# Patient Record
Sex: FEMALE | Race: WHITE | NOT HISPANIC OR LATINO | Employment: FULL TIME | URBAN - METROPOLITAN AREA
[De-identification: names, ages, dates, MRNs, and addresses within clinical notes are randomized per-mention and may not be internally consistent; named-entity substitution may affect disease eponyms.]

---

## 2017-01-03 ENCOUNTER — COMMUNICATION - HEALTHEAST (OUTPATIENT)
Dept: FAMILY MEDICINE | Facility: CLINIC | Age: 42
End: 2017-01-03

## 2017-01-12 ENCOUNTER — OFFICE VISIT - HEALTHEAST (OUTPATIENT)
Dept: FAMILY MEDICINE | Facility: CLINIC | Age: 42
End: 2017-01-12

## 2017-01-12 DIAGNOSIS — Z00.00 ROUTINE GENERAL MEDICAL EXAMINATION AT A HEALTH CARE FACILITY: ICD-10-CM

## 2017-01-12 DIAGNOSIS — E03.8 OTHER SPECIFIED HYPOTHYROIDISM: ICD-10-CM

## 2017-01-12 LAB
CHOLEST SERPL-MCNC: 140 MG/DL
FASTING STATUS PATIENT QL REPORTED: YES
HDLC SERPL-MCNC: 37 MG/DL
LDLC SERPL CALC-MCNC: 88 MG/DL
TRIGL SERPL-MCNC: 73 MG/DL

## 2017-01-12 ASSESSMENT — MIFFLIN-ST. JEOR: SCORE: 1493.18

## 2017-01-17 ENCOUNTER — COMMUNICATION - HEALTHEAST (OUTPATIENT)
Dept: FAMILY MEDICINE | Facility: CLINIC | Age: 42
End: 2017-01-17

## 2017-01-17 LAB
HPV INTERPRETATION - HISTORICAL: NORMAL
HPV INTERPRETER - HISTORICAL: NORMAL

## 2017-01-19 LAB
BKR LAB AP ABNORMAL BLEEDING: NO
BKR LAB AP BIRTH CONTROL/HORMONES: NORMAL
BKR LAB AP CERVICAL APPEARANCE: NORMAL
BKR LAB AP GYN ADEQUACY: NORMAL
BKR LAB AP GYN INTERPRETATION: NORMAL
BKR LAB AP HPV REFLEX: NORMAL
BKR LAB AP LMP: NORMAL
BKR LAB AP PATIENT STATUS: NO
BKR LAB AP PREVIOUS ABNORMAL: NO
BKR LAB AP PREVIOUS NORMAL: 2013
HIGH RISK?: NO
PATH REPORT.COMMENTS IMP SPEC: NORMAL
RESULT FLAG (HE HISTORICAL CONVERSION): NORMAL

## 2017-05-19 ENCOUNTER — OFFICE VISIT - HEALTHEAST (OUTPATIENT)
Dept: FAMILY MEDICINE | Facility: CLINIC | Age: 42
End: 2017-05-19

## 2017-05-19 DIAGNOSIS — J02.9 SORE THROAT: ICD-10-CM

## 2017-05-19 DIAGNOSIS — E55.9 VITAMIN D DEFICIENCY: ICD-10-CM

## 2017-10-27 ENCOUNTER — COMMUNICATION - HEALTHEAST (OUTPATIENT)
Dept: FAMILY MEDICINE | Facility: CLINIC | Age: 42
End: 2017-10-27

## 2017-10-31 ENCOUNTER — OFFICE VISIT - HEALTHEAST (OUTPATIENT)
Dept: FAMILY MEDICINE | Facility: CLINIC | Age: 42
End: 2017-10-31

## 2017-10-31 DIAGNOSIS — L71.0 PERIORAL DERMATITIS: ICD-10-CM

## 2017-10-31 DIAGNOSIS — E03.8 OTHER SPECIFIED HYPOTHYROIDISM: ICD-10-CM

## 2017-10-31 ASSESSMENT — MIFFLIN-ST. JEOR: SCORE: 1502.25

## 2017-11-01 ENCOUNTER — COMMUNICATION - HEALTHEAST (OUTPATIENT)
Dept: FAMILY MEDICINE | Facility: CLINIC | Age: 42
End: 2017-11-01

## 2017-11-20 ENCOUNTER — OFFICE VISIT (OUTPATIENT)
Dept: ORTHOPEDICS | Facility: CLINIC | Age: 42
End: 2017-11-20
Payer: COMMERCIAL

## 2017-11-20 VITALS
SYSTOLIC BLOOD PRESSURE: 112 MMHG | BODY MASS INDEX: 30.22 KG/M2 | DIASTOLIC BLOOD PRESSURE: 72 MMHG | WEIGHT: 188 LBS | HEIGHT: 66 IN

## 2017-11-20 DIAGNOSIS — M76.61 RIGHT ACHILLES TENDINITIS: Primary | ICD-10-CM

## 2017-11-20 PROCEDURE — 99203 OFFICE O/P NEW LOW 30 MIN: CPT | Performed by: PEDIATRICS

## 2017-11-20 NOTE — MR AVS SNAPSHOT
After Visit Summary   11/20/2017    Katie Santoro    MRN: [id number]           Patient Information     Date Of Birth          1975        Visit Information        Provider Department      11/20/2017 8:00 AM Lev France, DO [facility]        Today's Diagnoses     Right Achilles tendinitis    -  1      Care Instructions    Ibuprofen (Advil) maximum of 800mg three times a day with food (Maximum of 2400mg/day)             Follow-ups after your visit        Additional Services     [facility] PT, HAND, AND CHIROPRACTIC REFERRAL       **This order will print in the [facility] Scheduling Office**    Physical Therapy, Hand Therapy and Chiropractic Care are available through:    *[facility]  *[facility]  *[facility]    Call one number to schedule at any of the above locations: [phone].    Your provider has referred you to: Physical Therapy at [facility] or [facility]    Indication/Reason for Referral: Right Achilles tendinitis  (primary encounter diagnosis)    Onset of Illness: ~3 weeks  Therapy Orders: Evaluate and Treat  Special Programs: None  Special Request: None    Michelle Martinez      Additional Comments for the Therapist or Chiropractor: working out at [facility] wants to keep working out    Please be aware that coverage of these services is subject to the terms and limitations of your health insurance plan.  Call member services at your health plan with any benefit or coverage questions.      Please bring the following to your appointment:    *Your personal calendar for scheduling future appointments  *Comfortable clothing                  Who to contact     If you have questions or need follow up information about today's clinic visit or your schedule please contact [facility] directly at [phone].  Normal or non-critical lab and imaging results will be communicated to you by Mynor  "letter or phone within 4 business days after the clinic has received the results. If you do not hear from us within 7 days, please contact the clinic through Tapgage or phone. If you have a critical or abnormal lab result, we will notify you by phone as soon as possible.  Submit refill requests through Tapgage or call your pharmacy and they will forward the refill request to us. Please allow 3 business days for your refill to be completed.          Additional Information About Your Visit        Tapgage Information     Tapgage lets you send messages to your doctor, view your test results, renew your prescriptions, schedule appointments and more. To sign up, go to [email or web address] . Click on \"Log in\" on the left side of the screen, which will take you to the Welcome page. Then click on \"Sign up Now\" on the right side of the page.     You will be asked to enter the access code listed below, as well as some personal information. Please follow the directions to create your username and password.     Your access code is: [email or web address]  Expires: 2018  8:32 AM     Your access code will  in 90 days. If you need help or a new code, please call your [facility] clinic or [phone].        Care EveryWhere ID     This is your Care EveryWhere ID. This could be used by other organizations to access your [facility] medical records  [id number]        Your Vitals Were     Height BMI (Body Mass Index)                5' 6\" (1.676 m) 30.34 kg/m2           Blood Pressure from Last 3 Encounters:   17 112/72   10/12/15 125/81   04/30/15 114/66    Weight from Last 3 Encounters:   17 188 lb (85.3 kg)   10/12/15 175 lb 4.8 oz (79.5 kg)   04/30/15 193 lb 3.2 oz (87.6 kg)              We Performed the Following     [facility] PT, HAND, AND CHIROPRACTIC REFERRAL        Primary Care Provider Office Phone # Fax #    Dee Morgan PA-C [phone] [address] [place] [address]        Equal " Access to Services     [facility]: Hadii aad ku hadmansoorvictorina Darianaali, wakamida luqdemetrioha, qabernardino mounakevinryan bell. So [facility] [phone].    ATENCIÓN: Si habla español, tiene a mccarty disposición servicios gratuitos de asistencia lingüística. Llame al [phone].    We comply with applicable federal civil rights laws and [place] laws. We do not discriminate on the basis of race, color, national origin, age, disability, sex, sexual orientation, or gender identity.            Thank you!     Thank you for choosing [facility]  for your care. Our goal is always to provide you with excellent care. Hearing back from our patients is one way we can continue to improve our services. Please take a few minutes to complete the written survey that you may receive in the mail after your visit with us. Thank you!             Your Updated Medication List - Protect others around you: Learn how to safely use, store and throw away your medicines at www.disposemymeds.org.          This list is accurate as of: 11/20/17  8:32 AM.  Always use your most recent med list.                   Brand Name Dispense Instructions for use Diagnosis    SYNTHROID 125 MCG tablet   Generic drug:  levothyroxine      Take 125 mcg by mouth daily

## 2017-11-20 NOTE — PROGRESS NOTES
"Sports Medicine Clinic Visit    PCP: Dee Morgan    Katie Yvan is a 41 year old female who is seen  as a self referral presenting with right Achilles tendon pain.  Pain has been present for about 2-3 weeks.  Did just begin Villagomez's, and began training without shoes.  Has tried KT tape, ice, stretching.      **  Last few weeks has noted pain in the middle of the posterior ankle, near the achilles. Pain increases with activity.  Doesn't have pain at rest. Has not noted, swelling, clicking, popping or snapping.  Does have burning in the heel, posterior aspect.  Pain increases with \"mountain climbers\".   No numbness and tingling.       Injury: overuse     Location of Pain: right Achilles tendon   Duration of Pain: 2-3 week(s)  Rating of Pain at worst: 6/10  Rating of Pain Currently: 1/10  Symptoms are better with: Rest  Symptoms are worse with: first few steps, jumping, being on toes in workouts   Additional Features:   Positive:    Negative: swelling, bruising, popping, grinding, catching, locking, instability, paresthesias, numbness, weakness, pain in other joints and systemic symptoms  Other evaluation and/or treatments so far consists of: Nothing  Prior History of related problems: HX of ankle sprains    Social History: IT     Review of Systems  Musculoskeletal: as above  Remainder of review of systems is negative including constitutional, CV, pulmonary, GI, Skin and Neurologic except as noted in HPI or medical history.    Patient Active Problem List   Diagnosis     CARDIOVASCULAR SCREENING; LDL GOAL LESS THAN 160     PMHx: as above    Past Surgical History:   Procedure Laterality Date     GYN SURGERY  2010         ORTHOPEDIC SURGERY Right 2006    shoulder arthroscopy     Family History   Problem Relation Age of Onset     Hypertension Mother      Allergies Mother      Thyroid Disease Mother      Coronary Artery Disease Father      Allergies Father      Cardiovascular Father      HEART DISEASE " "Father      Obesity Father      Coronary Artery Disease Maternal Grandmother      Hypertension Maternal Grandmother      Allergies Maternal Grandmother      Alcohol/Drug Maternal Grandmother      Arthritis Maternal Grandmother      Cardiovascular Maternal Grandmother      HEART DISEASE Maternal Grandmother      Thyroid Disease Maternal Grandmother      Coronary Artery Disease Maternal Grandfather      Cardiovascular Maternal Grandfather      Allergies Brother      Social History     Social History     Marital status:      Spouse name: N/A     Number of children: N/A     Years of education: N/A     Occupational History     Not on file.     Social History Main Topics     Smoking status: Never Smoker     Smokeless tobacco: Never Used     Alcohol use No     Drug use: No     Sexual activity: Not on file     Other Topics Concern     Not on file     Social History Narrative       Objective  /72  Ht 5' 6\" (1.676 m)  Wt 188 lb (85.3 kg)  BMI 30.34 kg/m2    GENERAL APPEARANCE: healthy, alert and no distress   GAIT: NORMAL  SKIN: no suspicious lesions or rashes  NEURO: Normal strength and tone, mentation intact and speech normal  PSYCH:  mentation appears normal and affect normal/bright  HEENT: no scleral icterus  CV: no extremity edema   RESP: nonlabored breathing    Right Ankle Exam:    Inspection:       no visible ecchymosis       no visible edema or effusion  KT tape over the posterior ankle    Foot inspection:       no deformity noted    ROM:        full ROM with dorsiflexion, plantarflexion, inversion and eversion       No change in pain with above     Strength:        Pain with DF, full strength        No change in pain with PF, eversion and inversion, full strength     Tender:       over achilles      Non-Tender:       remainder of foot and ankle    Skin:       well perfused       capillary refill less than 2 seconds    Special Tests:       Galloway Test - normal        Able to go up onto toes, " increased pain        Able to single leg stance, increased pain     Sensation:  Grossly intact to light touch      Radiology:  None today     Assessment:  1. Right Achilles tendinitis        Plan:  Discussed the assessment with the patient and her daugther.    Discussed:  *Symptom Treatment   *Activity Modification   *Support - Heel lift   *Rehab - Home exercises vs Formal PT    *Medication - OTC     Topical Treatments: Ice prn   Over the counter medication: Ibuprofen (Advil) maximum of 800mg three times a day with food  Activity Modification: as discussed . Advised that continued impact activities likely will limit progress she makes with therapy. She understands, and wants to continue with workouts for now.  Rehab: Physical Therapy: [facility] - [phone]  Medical Equipment: Heel lift shown to the patient; declined gel cups   Follow up: in 3-4 weeks if not improving; otherwise, as needed  . If not improving, likely rest from kickboxing as the next step.  Questions answered. The patient indicates understanding of these issues and agrees with the plan.     Lev France, DO, CAQ         Disclaimer: This note consists of symbols derived from keyboarding, dictation and/or voice recognition software. As a result, there may be errors in the script that have gone undetected. Please consider this when interpreting information found in this chart.

## 2017-11-20 NOTE — PATIENT INSTRUCTIONS
Ibuprofen (Advil) maximum of 800mg three times a day with food (Maximum of 2400mg/day)     Start physical therapy.

## 2017-11-20 NOTE — LETTER
"    2017         RE: Katie Santoro  [address] [place] [address]        Dear Colleague,    Thank you for referring your patient, Katie Santoro, to the [facility]. Please see a copy of my visit note below.    Sports Medicine Clinic Visit    PCP: Dee Morgan    Katie Santoro is a 41 year old female who is seen  as a self referral presenting with right Achilles tendon pain.  Pain has been present for about 2-3 weeks.  Did just begin Villagomez's, and began training without shoes.  Has tried KT tape, ice, stretching.      **  Last few weeks has noted pain in the middle of the posterior ankle, near the achilles. Pain increases with activity.  Doesn't have pain at rest. Has not noted, swelling, clicking, popping or snapping.  Does have burning in the heel, posterior aspect.  Pain increases with \"mountain climbers\".   No numbness and tingling.       Injury: overuse     Location of Pain: right Achilles tendon   Duration of Pain: 2-3 week(s)  Rating of Pain at worst: 6/10  Rating of Pain Currently: 1/10  Symptoms are better with: Rest  Symptoms are worse with: first few steps, jumping, being on toes in workouts   Additional Features:   Positive:    Negative: swelling, bruising, popping, grinding, catching, locking, instability, paresthesias, numbness, weakness, pain in other joints and systemic symptoms  Other evaluation and/or treatments so far consists of: Nothing  Prior History of related problems: HX of ankle sprains    Social History: IT     Review of Systems  Musculoskeletal: as above  Remainder of review of systems is negative including constitutional, CV, pulmonary, GI, Skin and Neurologic except as noted in HPI or medical history.    Patient Active Problem List   Diagnosis     CARDIOVASCULAR SCREENING; LDL GOAL LESS THAN 160     PMHx: as above    Past Surgical History:   Procedure Laterality Date     GYN SURGERY  2010         ORTHOPEDIC SURGERY Right  " "   shoulder arthroscopy     Family History   Problem Relation Age of Onset     Hypertension Mother      Allergies Mother      Thyroid Disease Mother      Coronary Artery Disease Father      Allergies Father      Cardiovascular Father      HEART DISEASE Father      Obesity Father      Coronary Artery Disease Maternal Grandmother      Hypertension Maternal Grandmother      Allergies Maternal Grandmother      Alcohol/Drug Maternal Grandmother      Arthritis Maternal Grandmother      Cardiovascular Maternal Grandmother      HEART DISEASE Maternal Grandmother      Thyroid Disease Maternal Grandmother      Coronary Artery Disease Maternal Grandfather      Cardiovascular Maternal Grandfather      Allergies Brother      Social History     Social History     Marital status:      Spouse name: N/A     Number of children: N/A     Years of education: N/A     Occupational History     Not on file.     Social History Main Topics     Smoking status: Never Smoker     Smokeless tobacco: Never Used     Alcohol use No     Drug use: No     Sexual activity: Not on file     Other Topics Concern     Not on file     Social History Narrative       Objective  /72  Ht 5' 6\" (1.676 m)  Wt 188 lb (85.3 kg)  BMI 30.34 kg/m2    GENERAL APPEARANCE: healthy, alert and no distress   GAIT: NORMAL  SKIN: no suspicious lesions or rashes  NEURO: Normal strength and tone, mentation intact and speech normal  PSYCH:  mentation appears normal and affect normal/bright  HEENT: no scleral icterus  CV: no extremity edema   RESP: nonlabored breathing    Right Ankle Exam:    Inspection:       no visible ecchymosis       no visible edema or effusion  KT tape over the posterior ankle    Foot inspection:       no deformity noted    ROM:        full ROM with dorsiflexion, plantarflexion, inversion and eversion       No change in pain with above     Strength:        Pain with DF, full strength        No change in pain with PF, eversion and inversion, " full strength     Tender:       over achilles      Non-Tender:       remainder of foot and ankle    Skin:       well perfused       capillary refill less than 2 seconds    Special Tests:       Galloway Test - normal        Able to go up onto toes, increased pain        Able to single leg stance, increased pain     Sensation:  Grossly intact to light touch      Radiology:  None today     Assessment:  1. Right Achilles tendinitis        Plan:  Discussed the assessment with the patient and her daugther.    Discussed:  *Symptom Treatment   *Activity Modification   *Support - Heel lift   *Rehab - Home exercises vs Formal PT    *Medication - OTC     Topical Treatments: Ice prn   Over the counter medication: Ibuprofen (Advil) maximum of 800mg three times a day with food  Activity Modification: as discussed . Advised that continued impact activities likely will limit progress she makes with therapy. She understands, and wants to continue with workouts for now.  Rehab: Physical Therapy: [facility] - [phone]  Medical Equipment: Heel lift shown to the patient; declined gel cups   Follow up: in 3-4 weeks if not improving; otherwise, as needed  . If not improving, likely rest from kickboxing as the next step.  Questions answered. The patient indicates understanding of these issues and agrees with the plan.     Lev France DO, CAQ         Disclaimer: This note consists of symbols derived from keyboarding, dictation and/or voice recognition software. As a result, there may be errors in the script that have gone undetected. Please consider this when interpreting information found in this chart.        Again, thank you for allowing me to participate in the care of your patient.        Sincerely,        Lev France DO

## 2017-12-01 ENCOUNTER — THERAPY VISIT (OUTPATIENT)
Dept: PHYSICAL THERAPY | Facility: CLINIC | Age: 42
End: 2017-12-01
Payer: COMMERCIAL

## 2017-12-01 DIAGNOSIS — M76.60 ACHILLES TENDINITIS: Primary | ICD-10-CM

## 2017-12-01 PROCEDURE — 97161 PT EVAL LOW COMPLEX 20 MIN: CPT | Mod: GP | Performed by: PHYSICAL THERAPIST

## 2017-12-01 PROCEDURE — 97140 MANUAL THERAPY 1/> REGIONS: CPT | Mod: GP | Performed by: PHYSICAL THERAPIST

## 2017-12-01 PROCEDURE — 97110 THERAPEUTIC EXERCISES: CPT | Mod: GP | Performed by: PHYSICAL THERAPIST

## 2017-12-01 PROCEDURE — 97112 NEUROMUSCULAR REEDUCATION: CPT | Mod: GP | Performed by: PHYSICAL THERAPIST

## 2017-12-01 NOTE — MR AVS SNAPSHOT
"              After Visit Summary   12/1/2017    Katie Santoro    MRN: [id number]           Patient Information     Date Of Birth          1975        Visit Information        Provider Department      12/1/2017 8:10 AM Stefanie Samson, PT [facility]        Today's Diagnoses     Achilles tendinitis    -  1       Follow-ups after your visit        Your next 10 appointments already scheduled     Dec 08, 2017  8:50 AM CST   [facility] Extremity with Darlene Obrien [facility]    [address] [address] [phone]            Dec 15, 2017  7:30 AM CST   [facility] Extremity with Darlene Obrien [facility]    [address]  [facility] [address] [phone]              Who to contact     If you have questions or need follow up information about today's clinic visit or your schedule please contact [facility] directly at [phone].  Normal or non-critical lab and imaging results will be communicated to you by iDoc24hart, letter or phone within 4 business days after the clinic has received the results. If you do not hear from us within 7 days, please contact the clinic through Blipifyt or phone. If you have a critical or abnormal lab result, we will notify you by phone as soon as possible.  Submit refill requests through Viss or call your pharmacy and they will forward the refill request to us. Please allow 3 business days for your refill to be completed.          Additional Information About Your Visit        [email or web address] [employer] Information     Viss lets you send messages to your doctor, view your test results, renew your prescriptions, schedule appointments and more. To sign up, go to [email or web address] . Click on \"Log in\" on the left side of the screen, which will take you to the Welcome page. Then click on \"Sign up Now\" on the right side of the page.     You will be asked to enter the " access code listed below, as well as some personal information. Please follow the directions to create your username and password.     Your access code is: [email or web address]  Expires: 2018  8:32 AM     Your access code will  in 90 days. If you need help or a new code, please call your [facility] clinic or [phone].        Care EveryWhere ID     This is your Care EveryWhere ID. This could be used by other organizations to access your [facility] medical records  [id number]         Blood Pressure from Last 3 Encounters:   17 112/72   10/12/15 125/81   04/30/15 114/66    Weight from Last 3 Encounters:   17 85.3 kg (188 lb)   10/12/15 79.5 kg (175 lb 4.8 oz)   04/30/15 87.6 kg (193 lb 3.2 oz)              We Performed the Following     HC PT EVAL, LOW COMPLEXITY     [facility] INITIAL EVAL REPORT     MANUAL THER TECH,1+REGIONS,EA 15 MIN     NEUROMUSCULAR RE-EDUCATION     THERAPEUTIC EXERCISES        Primary Care Provider Office Phone # Fax #    Dee Morgan PA-C [phone] [address] [facility] [address]        Equal Access to Services     [facility]: Hadii aad ku hadasho Soomaali, waaxda luqadaha, qaybta kaalmada adeegyada, waxay ericain haylizn antonette bowles. So [facility] [phone].    ATENCIÓN: Si habla español, tiene a mccarty disposición servicios gratuitos de asistencia lingüística. Llame al [phone].    We comply with applicable federal civil rights laws and [place] laws. We do not discriminate on the basis of race, color, national origin, age, disability, sex, sexual orientation, or gender identity.            Thank you!     Thank you for choosing [facility]  for your care. Our goal is always to provide you with excellent care. Hearing back from our patients is one way we can continue to improve our services. Please take a few minutes to complete the written survey that you may receive in the mail after your visit with us. Thank you!              Your Updated Medication List - Protect others around you: Learn how to safely use, store and throw away your medicines at www.disposemymeds.org.          This list is accurate as of: 12/1/17 12:43 PM.  Always use your most recent med list.                   Brand Name Dispense Instructions for use Diagnosis    SYNTHROID 125 MCG tablet   Generic drug:  levothyroxine      Take 125 mcg by mouth daily

## 2017-12-01 NOTE — PROGRESS NOTES
Subjective:    Patient is a 41 year old female presenting with rehab right ankle/foot hpi. History provided by: Pt has had stress fractures in right foot in past and ankle sprains.   Katie Santoro is a 41 year old female with a right ankle condition.  Condition occurred with:  Repetition/overuse and insidious onset.  Condition occurred: during recreation/sport.  This is a new condition  One month ago.    Patient reports pain:  Posterior.    Pain is described as aching (burning into heel with higher level activity) and is constant and reported as 4/10.  Associated symptoms:  Loss of motion/stiffness. Worse during: activity decpendent  stiff in am gets better duering day unless heacy acitivity.  Symptoms are exacerbated by activity, descending stairs and ascending stairs (jumping, mountain climer, lunges) and relieved by ice.  Since onset symptoms are unchanged.        General health as reported by patient is excellent.  Pertinent medical history includes:  Overweight.  Medical allergies: no.  Other surgeries include:  Orthopedic surgery (shoulder srthroscopy).            Barriers include:  None as reported by patient.    Red flags:  None as reported by patient.                        Objective:    Standing Alignment:                Ankle/Foot:  Normal (excessive supination in shoe)              Ankle/Foot Evaluation  ROM:    AROM:    Dorsiflexion:  Left:   15  Right:   15  Plantarflexion:  Left:  Wnl    Right:  Wnl  Inversion:  Left:  15     Right:  15  Eversion:  30     Right:  30      PROM:                Pain: pain with end dorsi flexion    Strength:    Dorsiflexion:  Right:  5/5   Strong/pain free  Pain:-  Plantarflexion: Left:  5/5   Strong/pain free  Pain:-   Right: /5 strong/painful  Pain:++  Inversion:Left:  5/5  Strong/pain free  Pain:-     Right:  5/5  Strong/pain free  Pain:-  Eversion:Left: 5/5   Pain:-  Right:  5/5  Strong/pain free  Pain:-      Anterior Tibialis:Right: /5 Pain:-  Posterior Tibialis:  Right: 5/5   Pain:-  Peroneals: Right: 5/5   Pain:-    Gastroc/Soleus:Right: /5 strong/painful  Pain:++    SPECIAL TESTS:       Right ankle negative for the following special tests:  Galloway sign  PALPATION:     Right ankle tenderness present at:   gastroc/soleus and achilles tendon  EDEMA: normal            FUNCTIONAL TESTS:         Quad:      Bilateral Leg Squat:  Control is mild loss of control and excessive anterior knee excursion    Proprioception:  Stork Balance Test: % of Uninvolved: balance on right significantly reduced from left wheneyes are closed                                                    General     ROS    Assessment/Plan:      Patient is a 41 year old female with right side ankle complaints.    Patient has the following significant findings with corresponding treatment plan.                Diagnosis 1:  r achilles tendinitis  Pain -  hot/cold therapy, manual therapy and self management  Decreased strength - therapeutic exercise and therapeutic activities  Impaired balance - neuro re-education and therapeutic activities  Decreased proprioception - neuro re-education and therapeutic activities    Therapy Evaluation Codes:   1) History comprised of:   Personal factors that impact the plan of care:      None.    Comorbidity factors that impact the plan of care are:      Overweight and thyroid problems.     Medications impacting care: None.  2) Examination of Body Systems comprised of:   Body structures and functions that impact the plan of care:      Ankle.   Activity limitations that impact the plan of care are:      Jumping, Sports and Stairs.  3) Clinical presentation characteristics are:   Stable/Uncomplicated.  4) Decision-Making    Low complexity using standardized patient assessment instrument and/or measureable assessment of functional outcome.  Cumulative Therapy Evaluation is: Low complexity.    Previous and current functional limitations:  (See Goal Flow Sheet for this information)     Short term and Long term goals: (See Goal Flow Sheet for this information)     Communication ability:  Patient appears to be able to clearly communicate and understand verbal and written communication and follow directions correctly.  Treatment Explanation - The following has been discussed with the patient:   RX ordered/plan of care  Anticipated outcomes  Possible risks and side effects  This patient would benefit from PT intervention to resume normal activities.   Rehab potential is excellent.    Frequency:  1 X week, once daily  Duration:  for 6 weeks  Discharge Plan:  Achieve all LTG.  Independent in home treatment program.  Reach maximal therapeutic benefit.    Please refer to the daily flowsheet for treatment today, total treatment time and time spent performing 1:1 timed codes.

## 2017-12-08 ENCOUNTER — THERAPY VISIT (OUTPATIENT)
Dept: PHYSICAL THERAPY | Facility: CLINIC | Age: 42
End: 2017-12-08
Payer: COMMERCIAL

## 2017-12-08 DIAGNOSIS — M76.61 ACHILLES TENDINITIS OF RIGHT LOWER EXTREMITY: Primary | ICD-10-CM

## 2017-12-08 PROCEDURE — 97110 THERAPEUTIC EXERCISES: CPT | Mod: GP | Performed by: PHYSICAL THERAPIST

## 2017-12-08 PROCEDURE — 97140 MANUAL THERAPY 1/> REGIONS: CPT | Mod: GP | Performed by: PHYSICAL THERAPIST

## 2017-12-15 ENCOUNTER — THERAPY VISIT (OUTPATIENT)
Dept: PHYSICAL THERAPY | Facility: CLINIC | Age: 42
End: 2017-12-15
Payer: COMMERCIAL

## 2017-12-15 DIAGNOSIS — M76.61 ACHILLES TENDINITIS OF RIGHT LOWER EXTREMITY: ICD-10-CM

## 2017-12-15 PROCEDURE — 97112 NEUROMUSCULAR REEDUCATION: CPT | Mod: GP | Performed by: PHYSICAL THERAPIST

## 2017-12-15 PROCEDURE — 97140 MANUAL THERAPY 1/> REGIONS: CPT | Mod: GP | Performed by: PHYSICAL THERAPIST

## 2017-12-15 PROCEDURE — 97035 APP MDLTY 1+ULTRASOUND EA 15: CPT | Mod: GP | Performed by: PHYSICAL THERAPIST

## 2017-12-22 ENCOUNTER — THERAPY VISIT (OUTPATIENT)
Dept: PHYSICAL THERAPY | Facility: CLINIC | Age: 42
End: 2017-12-22
Payer: COMMERCIAL

## 2017-12-22 DIAGNOSIS — M76.61 ACHILLES TENDINITIS OF RIGHT LOWER EXTREMITY: ICD-10-CM

## 2017-12-22 PROCEDURE — 97140 MANUAL THERAPY 1/> REGIONS: CPT | Mod: GP | Performed by: PHYSICAL THERAPIST

## 2017-12-22 PROCEDURE — 97112 NEUROMUSCULAR REEDUCATION: CPT | Mod: GP | Performed by: PHYSICAL THERAPIST

## 2017-12-22 PROCEDURE — 97035 APP MDLTY 1+ULTRASOUND EA 15: CPT | Mod: GP | Performed by: PHYSICAL THERAPIST

## 2017-12-24 ENCOUNTER — HEALTH MAINTENANCE LETTER (OUTPATIENT)
Age: 42
End: 2017-12-24

## 2017-12-29 ENCOUNTER — APPOINTMENT (OUTPATIENT)
Dept: PHYSICAL THERAPY | Facility: CLINIC | Age: 42
End: 2017-12-29
Payer: COMMERCIAL

## 2018-05-24 ENCOUNTER — OFFICE VISIT - HEALTHEAST (OUTPATIENT)
Dept: FAMILY MEDICINE | Facility: CLINIC | Age: 43
End: 2018-05-24

## 2018-05-24 DIAGNOSIS — Z00.00 ROUTINE GENERAL MEDICAL EXAMINATION AT A HEALTH CARE FACILITY: ICD-10-CM

## 2018-05-24 DIAGNOSIS — E03.8 OTHER SPECIFIED HYPOTHYROIDISM: ICD-10-CM

## 2018-05-24 DIAGNOSIS — M25.50 ARTHRALGIA: ICD-10-CM

## 2018-05-24 LAB
ALBUMIN SERPL-MCNC: 4 G/DL (ref 3.5–5)
ALP SERPL-CCNC: 87 U/L (ref 45–120)
ALT SERPL W P-5'-P-CCNC: 11 U/L (ref 0–45)
ANION GAP SERPL CALCULATED.3IONS-SCNC: 11 MMOL/L (ref 5–18)
AST SERPL W P-5'-P-CCNC: 15 U/L (ref 0–40)
BILIRUB SERPL-MCNC: 0.5 MG/DL (ref 0–1)
BUN SERPL-MCNC: 19 MG/DL (ref 8–22)
C REACTIVE PROTEIN LHE: 0.5 MG/DL (ref 0–0.8)
CALCIUM SERPL-MCNC: 9.2 MG/DL (ref 8.5–10.5)
CCP AB SER IA-ACNC: 1 U/ML
CHLORIDE BLD-SCNC: 106 MMOL/L (ref 98–107)
CHOLEST SERPL-MCNC: 139 MG/DL
CO2 SERPL-SCNC: 23 MMOL/L (ref 22–31)
CREAT SERPL-MCNC: 0.81 MG/DL (ref 0.6–1.1)
ERYTHROCYTE [DISTWIDTH] IN BLOOD BY AUTOMATED COUNT: 13.4 % (ref 11–14.5)
ERYTHROCYTE [SEDIMENTATION RATE] IN BLOOD BY WESTERGREN METHOD: 14 MM/HR (ref 0–20)
FASTING STATUS PATIENT QL REPORTED: YES
GFR SERPL CREATININE-BSD FRML MDRD: >60 ML/MIN/1.73M2
GLUCOSE BLD-MCNC: 86 MG/DL (ref 70–125)
HCT VFR BLD AUTO: 39.4 % (ref 35–47)
HDLC SERPL-MCNC: 38 MG/DL
HGB BLD-MCNC: 13.3 G/DL (ref 12–16)
LDLC SERPL CALC-MCNC: 84 MG/DL
MCH RBC QN AUTO: 30.7 PG (ref 27–34)
MCHC RBC AUTO-ENTMCNC: 33.8 G/DL (ref 32–36)
MCV RBC AUTO: 91 FL (ref 80–100)
PLATELET # BLD AUTO: 256 THOU/UL (ref 140–440)
PMV BLD AUTO: 6.7 FL (ref 7–10)
POTASSIUM BLD-SCNC: 4.2 MMOL/L (ref 3.5–5)
PROT SERPL-MCNC: 6.8 G/DL (ref 6–8)
RBC # BLD AUTO: 4.34 MILL/UL (ref 3.8–5.4)
RHEUMATOID FACT SERPL-ACNC: <15 IU/ML (ref 0–30)
SODIUM SERPL-SCNC: 140 MMOL/L (ref 136–145)
TRIGL SERPL-MCNC: 87 MG/DL
TSH SERPL DL<=0.005 MIU/L-ACNC: 0.53 UIU/ML (ref 0.3–5)
URATE SERPL-MCNC: 4.8 MG/DL (ref 2–7.5)
WBC: 3.5 THOU/UL (ref 4–11)

## 2018-05-24 ASSESSMENT — MIFFLIN-ST. JEOR: SCORE: 1461.43

## 2018-05-25 LAB — B BURGDOR IGG+IGM SER QL: 0.06 INDEX VALUE

## 2018-05-29 LAB — ANA SER QL: 1.5 U

## 2018-09-24 ENCOUNTER — THERAPY VISIT (OUTPATIENT)
Dept: PHYSICAL THERAPY | Facility: CLINIC | Age: 43
End: 2018-09-24
Payer: COMMERCIAL

## 2018-09-24 DIAGNOSIS — M75.42 IMPINGEMENT SYNDROME, SHOULDER, LEFT: ICD-10-CM

## 2018-09-24 PROBLEM — M76.60 ACHILLES TENDINITIS: Status: RESOLVED | Noted: 2017-12-01 | Resolved: 2018-09-24

## 2018-09-24 PROCEDURE — 97110 THERAPEUTIC EXERCISES: CPT | Mod: GP | Performed by: PHYSICAL THERAPIST

## 2018-09-24 PROCEDURE — 97161 PT EVAL LOW COMPLEX 20 MIN: CPT | Mod: GP | Performed by: PHYSICAL THERAPIST

## 2018-09-24 NOTE — PROGRESS NOTES
[facility] Initial Evaluation  Subjective:  Patient is a 42 year old female presenting with rehab left shoulder hpi. The history is provided by the patient.   Katie Santoro is a 42 year old female with a left shoulder condition.  Condition occurred with:  Other.  Condition occurred: other.  This is a new condition  Over the last 4 months has been hurting, not sure what started it, but now is hurting with certain movements and becoming more sharp.    Patient reports pain:  Lateral.  Radiates to:  Upper arm.  Pain is described as aching and sharp and is intermittent and reported as 8/10.  Associated symptoms:  Painful arc. Pain is worse during the day.  Symptoms are exacerbated by using arm at shoulder level, certain positions, using arm overhead, lying on extremity and other (pushing) and relieved by rest.  Since onset symptoms are gradually worsening.        General health as reported by patient is excellent.  Pertinent medical history includes:  Thyroid problems.  Medical allergies: adhesive.  Other surgeries include:  Orthopedic surgery (R shoulder decompression).  Current medications:  Thyroid medication and anti-inflammatory.  Current occupation .  Patient is working in normal job without restrictions.  Employment tasks: computer work.    Barriers include:  None as reported by the patient.    Red flags:  None as reported by the patient.                        Objective:  System                   Shoulder Evaluation:  ROM:  AROM:    Flexion:  Left:  Full        Abduction:  Left: 70 deg, pain       Internal Rotation:  Left:  To T8                      PROM:    Flexion:  Left:  168          Abduction:  Left:  170        Internal Rotation:  Left:  80      External Rotation:  Left:  65    Right:  90                    Strength:    Flexion: Left:5/5    Pain: -        Abduction:  Left: 4-/5   Pain:++        Internal Rotation:  Left:5/5      Pain:+      External Rotation:    Left:5/5      Pain:+               Special Tests:    Left shoulder positive for the following special tests:  Impingement  Left shoulder negative for the following special tests:  Labral and Rotator cuff tear    Palpation:    Left shoulder tenderness present at:  Supraspinatus and Infraspinatus  Left shoulder tenderness not present at: Biceps or Bicipital Groove    Mobility Tests:      Glenohumeral posterior left:  Hypomobile    Glenohumeral inferior left:  Hypomobile          Scapulohumeral rhythm right:  Hypomobile                                     General     ROS    Assessment/Plan:    Patient is a 42 year old female with left side shoulder complaints.    Patient has the following significant findings with corresponding treatment plan.                Diagnosis 1:  Left shoulder impingement  Pain -  hot/cold therapy, US and manual therapy  Decreased ROM/flexibility - manual therapy and therapeutic exercise  Decreased joint mobility - manual therapy and therapeutic exercise  Decreased strength - therapeutic exercise and therapeutic activities  Impaired muscle performance - neuro re-education    Therapy Evaluation Codes:   1) History comprised of:   Personal factors that impact the plan of care:      None.    Comorbidity factors that impact the plan of care are:      None.     Medications impacting care: Anti-inflammatory.  2) Examination of Body Systems comprised of:   Body structures and functions that impact the plan of care:      Shoulder.   Activity limitations that impact the plan of care are:      Bathing, Dressing, Lifting and Laying down.  3) Clinical presentation characteristics are:   Evolving/Changing.  4) Decision-Making    Moderate complexity using standardized patient assessment instrument and/or measureable assessment of functional outcome.  Cumulative Therapy Evaluation is: Low complexity.    Previous and current functional limitations:  (See Goal Flow Sheet for this information)    Short term and  Long term goals: (See Goal Flow Sheet for this information)     Communication ability:  Patient appears to be able to clearly communicate and understand verbal and written communication and follow directions correctly.  Treatment Explanation - The following has been discussed with the patient:   RX ordered/plan of care  Anticipated outcomes  Possible risks and side effects  This patient would benefit from PT intervention to resume normal activities.   Rehab potential is excellent.    Frequency:  1 X week, once daily  Duration:  for 8 weeks  Discharge Plan:  Achieve all LTG.  Independent in home treatment program.  Reach maximal therapeutic benefit.    Please refer to the daily flowsheet for treatment today, total treatment time and time spent performing 1:1 timed codes.

## 2018-09-24 NOTE — MR AVS SNAPSHOT
After Visit Summary   9/24/2018    Katie Santoro    MRN: [id number]           Patient Information     Date Of Birth          1975        Visit Information        Provider Department      9/24/2018 2:50 PM Darlene Obrien, PT [facility]        Today's Diagnoses     Impingement syndrome, shoulder, left           Follow-ups after your visit        Your next 10 appointments already scheduled     Sep 26, 2018  7:30 AM CDT   [place]ADE[place] Extremity with Amada Anguiano [facility]   [facility] ([facility])    [address]  [facility] [address]   [phone]            Oct 03, 2018  7:30 AM CDT   [facility]ADE[place] Extremity with Amada Anguiano [facility] ([facility]    [address] [facility] [address]   [phone]            Oct 05, 2018  7:30 AM CDT   [place] Extremity with Amada Anguiano [facility] ([facility])    [address]  [facility] [address]   [phone]            Oct 08, 2018  7:40 AM CDT   [place] Extremity with Amada Anguiano [facility] ([facility])    [address]  [facility] [address]   [phone]            Oct 10, 2018  8:00 AM CDT   [place]ADE[place] Extremity with Darlene Obrien PT   [facility] ([facility])    [address]  [facility] [address]   [phone]              Who to contact     If you have questions or need follow up information about today's clinic visit or your schedule please contact [facility] directly at [phone].  Normal or non-critical lab and imaging results will be communicated to you by MyChart, letter or phone within 4 business days after the clinic has received the results. If you do not hear from us within 7 days, please contact the clinic through MyChart or phone. If you have a critical or abnormal lab result, we will notify you by phone as soon as possible.  Submit refill requests through [employer]  "or call your pharmacy and they will forward the refill request to us. Please allow 3 business days for your refill to be completed.          Additional Information About Your Visit        MyChart Information     [employer] lets you send messages to your doctor, view your test results, renew your prescriptions, schedule appointments and more. To sign up, go to [email or web address] . Click on \"Log in\" on the left side of the screen, which will take you to the Welcome page. Then click on \"Sign up Now\" on the right side of the page.     You will be asked to enter the access code listed below, as well as some personal information. Please follow the directions to create your username and password.     Your access code is: [email or web address]  Expires: 2018  8:06 AM     Your access code will  in 90 days. If you need help or a new code, please call your [facility] clinic or [phone].        Care EveryWhere ID     This is your Care EveryWhere ID. This could be used by other organizations to access your [facility] medical records  [id number]         Blood Pressure from Last 3 Encounters:   17 112/72   10/12/15 125/81   04/30/15 114/66    Weight from Last 3 Encounters:   17 85.3 kg (188 lb)   10/12/15 79.5 kg (175 lb 4.8 oz)   04/30/15 87.6 kg (193 lb 3.2 oz)              We Performed the Following     PT Eval, Low Complexity (55006)     Therapeutic Exercises        Primary Care Provider Office Phone # Fax #    Dee Morgan PA-C [phone] [address] [address]        Equal Access to Services     STEPHENIE BRUNNER : Hadii rosalina diaz Soammon, waaxda luqadaha, qaybta kaalmada adedarriusyada, ryan bowles. So [facility] [phone].    ATENCIÓN: Si habla español, tiene a mccarty disposición servicios gratuitos de asistencia lingüística. Llame al [phone].    We comply with applicable federal civil rights laws and [place] laws. We do not discriminate " on the basis of race, color, national origin, age, disability, sex, sexual orientation, or gender identity.            Thank you!     Thank you for choosing [facility]  for your care. Our goal is always to provide you with excellent care. Hearing back from our patients is one way we can continue to improve our services. Please take a few minutes to complete the written survey that you may receive in the mail after your visit with us. Thank you!             Your Updated Medication List - Protect others around you: Learn how to safely use, store and throw away your medicines at www.disposemymeds.org.          This list is accurate as of 9/24/18 11:59 PM.  Always use your most recent med list.                   Brand Name Dispense Instructions for use Diagnosis    SYNTHROID 125 MCG tablet   Generic drug:  levothyroxine      Take 125 mcg by mouth daily

## 2018-09-25 PROBLEM — M75.42 IMPINGEMENT SYNDROME, SHOULDER, LEFT: Status: ACTIVE | Noted: 2018-09-25

## 2018-09-26 ENCOUNTER — THERAPY VISIT (OUTPATIENT)
Dept: PHYSICAL THERAPY | Facility: CLINIC | Age: 43
End: 2018-09-26
Payer: COMMERCIAL

## 2018-09-26 DIAGNOSIS — M75.42 IMPINGEMENT SYNDROME, SHOULDER, LEFT: ICD-10-CM

## 2018-09-26 PROCEDURE — 97110 THERAPEUTIC EXERCISES: CPT | Mod: GP

## 2018-09-26 PROCEDURE — 97140 MANUAL THERAPY 1/> REGIONS: CPT | Mod: GP

## 2018-10-03 ENCOUNTER — THERAPY VISIT (OUTPATIENT)
Dept: PHYSICAL THERAPY | Facility: CLINIC | Age: 43
End: 2018-10-03
Payer: COMMERCIAL

## 2018-10-03 DIAGNOSIS — M75.42 IMPINGEMENT SYNDROME, SHOULDER, LEFT: ICD-10-CM

## 2018-10-03 PROCEDURE — 97140 MANUAL THERAPY 1/> REGIONS: CPT | Mod: GP

## 2018-10-03 PROCEDURE — 97112 NEUROMUSCULAR REEDUCATION: CPT | Mod: GP

## 2018-10-03 PROCEDURE — 97530 THERAPEUTIC ACTIVITIES: CPT | Mod: GP

## 2018-10-05 ENCOUNTER — THERAPY VISIT (OUTPATIENT)
Dept: PHYSICAL THERAPY | Facility: CLINIC | Age: 43
End: 2018-10-05
Payer: COMMERCIAL

## 2018-10-05 DIAGNOSIS — M75.42 IMPINGEMENT SYNDROME, SHOULDER, LEFT: ICD-10-CM

## 2018-10-05 PROCEDURE — 97112 NEUROMUSCULAR REEDUCATION: CPT | Mod: GP

## 2018-10-05 PROCEDURE — 97110 THERAPEUTIC EXERCISES: CPT | Mod: GP

## 2018-10-05 PROCEDURE — 97140 MANUAL THERAPY 1/> REGIONS: CPT | Mod: GP

## 2018-10-08 ENCOUNTER — THERAPY VISIT (OUTPATIENT)
Dept: PHYSICAL THERAPY | Facility: CLINIC | Age: 43
End: 2018-10-08
Payer: COMMERCIAL

## 2018-10-08 DIAGNOSIS — M75.42 IMPINGEMENT SYNDROME, SHOULDER, LEFT: ICD-10-CM

## 2018-10-08 PROCEDURE — 97112 NEUROMUSCULAR REEDUCATION: CPT | Mod: GP

## 2018-10-08 PROCEDURE — 97110 THERAPEUTIC EXERCISES: CPT | Mod: GP

## 2018-10-08 PROCEDURE — 97140 MANUAL THERAPY 1/> REGIONS: CPT | Mod: GP

## 2018-10-10 ENCOUNTER — THERAPY VISIT (OUTPATIENT)
Dept: PHYSICAL THERAPY | Facility: CLINIC | Age: 43
End: 2018-10-10
Payer: COMMERCIAL

## 2018-10-10 DIAGNOSIS — M75.42 IMPINGEMENT SYNDROME, SHOULDER, LEFT: ICD-10-CM

## 2018-10-10 PROCEDURE — 97110 THERAPEUTIC EXERCISES: CPT | Mod: GP | Performed by: PHYSICAL THERAPIST

## 2018-10-10 PROCEDURE — 97112 NEUROMUSCULAR REEDUCATION: CPT | Mod: GP | Performed by: PHYSICAL THERAPIST

## 2018-10-10 PROCEDURE — 97140 MANUAL THERAPY 1/> REGIONS: CPT | Mod: GP | Performed by: PHYSICAL THERAPIST

## 2018-11-07 ENCOUNTER — THERAPY VISIT (OUTPATIENT)
Dept: PHYSICAL THERAPY | Facility: CLINIC | Age: 43
End: 2018-11-07
Payer: COMMERCIAL

## 2018-11-07 DIAGNOSIS — M75.42 IMPINGEMENT SYNDROME, SHOULDER, LEFT: ICD-10-CM

## 2018-11-07 PROCEDURE — 97110 THERAPEUTIC EXERCISES: CPT | Mod: GP

## 2018-11-07 PROCEDURE — 97140 MANUAL THERAPY 1/> REGIONS: CPT | Mod: GP

## 2018-11-07 PROCEDURE — 97112 NEUROMUSCULAR REEDUCATION: CPT | Mod: GP

## 2018-11-12 ENCOUNTER — THERAPY VISIT (OUTPATIENT)
Dept: PHYSICAL THERAPY | Facility: CLINIC | Age: 43
End: 2018-11-12
Payer: COMMERCIAL

## 2018-11-12 DIAGNOSIS — M75.42 IMPINGEMENT SYNDROME, SHOULDER, LEFT: ICD-10-CM

## 2018-11-12 PROCEDURE — 97140 MANUAL THERAPY 1/> REGIONS: CPT | Mod: GP | Performed by: PHYSICAL THERAPIST

## 2018-11-12 PROCEDURE — 97110 THERAPEUTIC EXERCISES: CPT | Mod: GP | Performed by: PHYSICAL THERAPIST

## 2018-11-16 ENCOUNTER — THERAPY VISIT (OUTPATIENT)
Dept: PHYSICAL THERAPY | Facility: CLINIC | Age: 43
End: 2018-11-16
Payer: COMMERCIAL

## 2018-11-16 DIAGNOSIS — M75.42 IMPINGEMENT SYNDROME, SHOULDER, LEFT: ICD-10-CM

## 2018-11-16 PROCEDURE — 97140 MANUAL THERAPY 1/> REGIONS: CPT | Mod: GP | Performed by: PHYSICAL THERAPIST

## 2018-11-16 PROCEDURE — 97110 THERAPEUTIC EXERCISES: CPT | Mod: GP | Performed by: PHYSICAL THERAPIST

## 2018-11-16 NOTE — PROGRESS NOTES
Subjective:  HPI                    Objective:  System    Physical Exam    General     ROS    Assessment/Plan:    PROGRESS  REPORT    Progress reporting period is from 9/25/18 to 11/16/2018.       SUBJECTIVE  Subjective changes noted by patient:    Subjective: Katie was really sore after last visit in posterior shoulder area, but today with boxing felt better with punches.  Continues to feel good with strengthening when she avoids painful movements.    Current pain level is 4/10  .     Previous pain level was  8/10  .   Changes in function:  Yes (See Goal flowsheet attached for changes in current functional level)  Adverse reaction to treatment or activity: None    OBJECTIVE  Changes noted in objective findings:  Yes,   Objective: Full PROM of shoulder and neck, hypertonic left upper trap and also tight and tender supra and infraspinatus, biceps less tender     ASSESSMENT/PLAN  Updated problem list and treatment plan: Diagnosis 1:  Shoulder pain  Pain -  manual therapy  Decreased strength - therapeutic exercise and therapeutic activities  Impaired muscle performance - neuro re-education  STG/LTGs have been met or progress has been made towards goals:  Yes (See Goal flow sheet completed today.)  Assessment of Progress: The patient's condition is improving.  Self Management Plans:  Patient has been instructed in a home treatment program.  I have re-evaluated this patient and find that the nature, scope, duration and intensity of the therapy is appropriate for the medical condition of the patient.  Katie continues to require the following intervention to meet STG and LTG's:  PT    Recommendations:  This patient would benefit from continued therapy.     Frequency:  2 X week, once daily  Duration:  for 4 weeks tapering to 1 X a week over 4 weeks        Please refer to the daily flowsheet for treatment today, total treatment time and time spent performing 1:1 timed codes.

## 2018-11-16 NOTE — MR AVS SNAPSHOT
After Visit Summary   11/16/2018    Katie Santoro    MRN: [id number]           Patient Information     Date Of Birth          1975        Visit Information        Provider Department      11/16/2018 7:30 AM Darlene Obrien PT [facility]        Today's Diagnoses     Impingement syndrome, shoulder, left           Follow-ups after your visit        Your next 10 appointments already scheduled     Nov 21, 2018  7:30 AM CST   [facility] Extremity with Amada Anguiano [facility]   [facility] ([facility])    [address] [facility] [address]   [phone]            Nov 28, 2018  8:10 AM CST   [place] Extremity with Amada Anguiano [facility] ([facility])    [address]  [facility] [address]   [phone]            Nov 30, 2018  7:30 AM CST   [place] Extremity with Darlene Obrien PT   [facility] ([facility])    [address]  [facility] [address]   [phone]            Dec 05, 2018  7:30 AM CST   [place] Extremity with Amada Anguiano [facility] ([facility])    [address] [facility] [address]   [phone]            Dec 07, 2018  7:30 AM CST   [place] Extremity with Darlene Obrien PT   [facility] ([facility])    [address]  [facility] [address]   [phone]            Dec 10, 2018  7:40 AM CST   [place] Extremity with Darlene Obrien PT   [facility] ([facility])    [address]  [facility] [address]   [phone]            Dec 14, 2018  7:30 AM CST   [place] Extremity with Darlene Obrien [facility] ([facility])    [address]  [facility] [address]   [phone]              Who to contact     If you have questions or need follow up information about today's clinic visit or your schedule please contact [facility] directly at [phone].  Normal or non-critical lab and imaging  "results will be communicated to you by MyChart, letter or phone within 4 business days after the clinic has received the results. If you do not hear from us within 7 days, please contact the clinic through [employer] or phone. If you have a critical or abnormal lab result, we will notify you by phone as soon as possible.  Submit refill requests through [employer] or call your pharmacy and they will forward the refill request to us. Please allow 3 business days for your refill to be completed.          Additional Information About Your Visit        [email or web address] Information     [employer] lets you send messages to your doctor, view your test results, renew your prescriptions, schedule appointments and more. To sign up, go to [email or web address] . Click on \"Log in\" on the left side of the screen, which will take you to the Welcome page. Then click on \"Sign up Now\" on the right side of the page.     You will be asked to enter the access code listed below, as well as some personal information. Please follow the directions to create your username and password.     Your access code is: [email or web address]  Expires: 2018  7:06 AM     Your access code will  in 90 days. If you need help or a new code, please call your [facility] clinic or [phone].        Care EveryWhere ID     This is your Care EveryWhere ID. This could be used by other organizations to access your [facility] medical records  [id number]         Blood Pressure from Last 3 Encounters:   17 112/72   10/12/15 125/81   04/30/15 114/66    Weight from Last 3 Encounters:   17 85.3 kg (188 lb)   10/12/15 79.5 kg (175 lb 4.8 oz)   04/30/15 87.6 kg (193 lb 3.2 oz)              We Performed the Following     Manual Ther Tech, 1+Regions, EA 15 min     Therapeutic Exercises        Primary Care Provider Office Phone # Fax #    Dee Morgan PA-C [phone] [address] [facility] [address]        Equal Access to Services     YOHANNES BRUNNER" AH: Juanita Herzog, horacioda luqminnie, qapatyta kabrayan darrylfariharyan arellanorosmerykate bowles. So [facility] [phone].    ATENCIÓN: Si habla español, tiene a mccarty disposición servicios gratuitos de asistencia lingüística. Llame al [phone].    We comply with applicable federal civil rights laws and [place] laws. We do not discriminate on the basis of race, color, national origin, age, disability, sex, sexual orientation, or gender identity.            Thank you!     Thank you for choosing [facility]  for your care. Our goal is always to provide you with excellent care. Hearing back from our patients is one way we can continue to improve our services. Please take a few minutes to complete the written survey that you may receive in the mail after your visit with us. Thank you!             Your Updated Medication List - Protect others around you: Learn how to safely use, store and throw away your medicines at www.disposemymeds.org.          This list is accurate as of 11/16/18  8:21 AM.  Always use your most recent med list.                   Brand Name Dispense Instructions for use Diagnosis    SYNTHROID 125 MCG tablet   Generic drug:  levothyroxine      Take 125 mcg by mouth daily

## 2018-11-30 ENCOUNTER — THERAPY VISIT (OUTPATIENT)
Dept: PHYSICAL THERAPY | Facility: CLINIC | Age: 43
End: 2018-11-30
Payer: COMMERCIAL

## 2018-11-30 DIAGNOSIS — M75.42 IMPINGEMENT SYNDROME, SHOULDER, LEFT: ICD-10-CM

## 2018-11-30 PROCEDURE — 97110 THERAPEUTIC EXERCISES: CPT | Mod: GP | Performed by: PHYSICAL THERAPIST

## 2018-11-30 PROCEDURE — 97140 MANUAL THERAPY 1/> REGIONS: CPT | Mod: GP | Performed by: PHYSICAL THERAPIST

## 2018-12-03 ENCOUNTER — RECORDS - HEALTHEAST (OUTPATIENT)
Dept: ADMINISTRATIVE | Facility: OTHER | Age: 43
End: 2018-12-03

## 2018-12-05 ENCOUNTER — THERAPY VISIT (OUTPATIENT)
Dept: PHYSICAL THERAPY | Facility: CLINIC | Age: 43
End: 2018-12-05
Payer: COMMERCIAL

## 2018-12-05 DIAGNOSIS — M75.42 IMPINGEMENT SYNDROME, SHOULDER, LEFT: ICD-10-CM

## 2018-12-05 PROCEDURE — 97110 THERAPEUTIC EXERCISES: CPT | Mod: GP

## 2018-12-05 PROCEDURE — 97140 MANUAL THERAPY 1/> REGIONS: CPT | Mod: GP

## 2018-12-10 ENCOUNTER — THERAPY VISIT (OUTPATIENT)
Dept: PHYSICAL THERAPY | Facility: CLINIC | Age: 43
End: 2018-12-10
Payer: COMMERCIAL

## 2018-12-10 DIAGNOSIS — M75.42 IMPINGEMENT SYNDROME, SHOULDER, LEFT: ICD-10-CM

## 2018-12-10 PROCEDURE — 97110 THERAPEUTIC EXERCISES: CPT | Mod: GP | Performed by: PHYSICAL THERAPIST

## 2018-12-10 PROCEDURE — 97140 MANUAL THERAPY 1/> REGIONS: CPT | Mod: GP | Performed by: PHYSICAL THERAPIST

## 2018-12-14 ENCOUNTER — THERAPY VISIT (OUTPATIENT)
Dept: PHYSICAL THERAPY | Facility: CLINIC | Age: 43
End: 2018-12-14
Payer: COMMERCIAL

## 2018-12-14 DIAGNOSIS — M75.42 IMPINGEMENT SYNDROME, SHOULDER, LEFT: ICD-10-CM

## 2018-12-14 PROCEDURE — 97110 THERAPEUTIC EXERCISES: CPT | Mod: GP | Performed by: PHYSICAL THERAPIST

## 2018-12-14 PROCEDURE — 97140 MANUAL THERAPY 1/> REGIONS: CPT | Mod: GP | Performed by: PHYSICAL THERAPIST

## 2018-12-19 ENCOUNTER — RECORDS - HEALTHEAST (OUTPATIENT)
Dept: ADMINISTRATIVE | Facility: OTHER | Age: 43
End: 2018-12-19

## 2019-01-07 ENCOUNTER — OFFICE VISIT - HEALTHEAST (OUTPATIENT)
Dept: FAMILY MEDICINE | Facility: CLINIC | Age: 44
End: 2019-01-07

## 2019-01-07 DIAGNOSIS — Z01.818 PREOP GENERAL PHYSICAL EXAM: ICD-10-CM

## 2019-01-07 DIAGNOSIS — M77.8 LEFT SHOULDER TENDONITIS: ICD-10-CM

## 2019-01-07 DIAGNOSIS — E03.8 OTHER SPECIFIED HYPOTHYROIDISM: ICD-10-CM

## 2019-01-07 LAB
HCG UR QL: NEGATIVE
HGB BLD-MCNC: 13.5 G/DL (ref 12–16)
SP GR UR STRIP: 1.02 (ref 1–1.03)

## 2019-01-07 ASSESSMENT — MIFFLIN-ST. JEOR: SCORE: 1534

## 2019-01-21 ENCOUNTER — RECORDS - HEALTHEAST (OUTPATIENT)
Dept: ADMINISTRATIVE | Facility: OTHER | Age: 44
End: 2019-01-21

## 2019-01-31 ENCOUNTER — RECORDS - HEALTHEAST (OUTPATIENT)
Dept: ADMINISTRATIVE | Facility: OTHER | Age: 44
End: 2019-01-31

## 2019-03-01 ENCOUNTER — RECORDS - HEALTHEAST (OUTPATIENT)
Dept: ADMINISTRATIVE | Facility: OTHER | Age: 44
End: 2019-03-01

## 2019-03-05 ENCOUNTER — COMMUNICATION - HEALTHEAST (OUTPATIENT)
Dept: FAMILY MEDICINE | Facility: CLINIC | Age: 44
End: 2019-03-05

## 2019-04-05 ENCOUNTER — RECORDS - HEALTHEAST (OUTPATIENT)
Dept: ADMINISTRATIVE | Facility: OTHER | Age: 44
End: 2019-04-05

## 2019-07-18 ENCOUNTER — RECORDS - HEALTHEAST (OUTPATIENT)
Dept: ADMINISTRATIVE | Facility: OTHER | Age: 44
End: 2019-07-18

## 2019-07-29 ENCOUNTER — RECORDS - HEALTHEAST (OUTPATIENT)
Dept: ADMINISTRATIVE | Facility: OTHER | Age: 44
End: 2019-07-29

## 2019-08-02 ENCOUNTER — COMMUNICATION - HEALTHEAST (OUTPATIENT)
Dept: FAMILY MEDICINE | Facility: CLINIC | Age: 44
End: 2019-08-02

## 2019-08-16 ENCOUNTER — OFFICE VISIT - HEALTHEAST (OUTPATIENT)
Dept: FAMILY MEDICINE | Facility: CLINIC | Age: 44
End: 2019-08-16

## 2019-08-16 DIAGNOSIS — F32.81 PMDD (PREMENSTRUAL DYSPHORIC DISORDER): ICD-10-CM

## 2019-08-16 DIAGNOSIS — E03.8 OTHER SPECIFIED HYPOTHYROIDISM: ICD-10-CM

## 2019-08-16 DIAGNOSIS — R03.0 ELEVATED BP WITHOUT DIAGNOSIS OF HYPERTENSION: ICD-10-CM

## 2019-08-16 LAB — TSH SERPL DL<=0.005 MIU/L-ACNC: 2.73 UIU/ML (ref 0.3–5)

## 2019-08-16 ASSESSMENT — MIFFLIN-ST. JEOR: SCORE: 1565.75

## 2019-09-09 ENCOUNTER — RECORDS - HEALTHEAST (OUTPATIENT)
Dept: ADMINISTRATIVE | Facility: OTHER | Age: 44
End: 2019-09-09

## 2019-09-13 ENCOUNTER — COMMUNICATION - HEALTHEAST (OUTPATIENT)
Dept: FAMILY MEDICINE | Facility: CLINIC | Age: 44
End: 2019-09-13

## 2019-09-13 DIAGNOSIS — F32.81 PMDD (PREMENSTRUAL DYSPHORIC DISORDER): ICD-10-CM

## 2019-09-30 ENCOUNTER — RECORDS - HEALTHEAST (OUTPATIENT)
Dept: ADMINISTRATIVE | Facility: OTHER | Age: 44
End: 2019-09-30

## 2019-10-15 ENCOUNTER — RECORDS - HEALTHEAST (OUTPATIENT)
Dept: ADMINISTRATIVE | Facility: OTHER | Age: 44
End: 2019-10-15

## 2019-10-16 ENCOUNTER — COMMUNICATION - HEALTHEAST (OUTPATIENT)
Dept: FAMILY MEDICINE | Facility: CLINIC | Age: 44
End: 2019-10-16

## 2019-10-16 DIAGNOSIS — E03.8 OTHER SPECIFIED HYPOTHYROIDISM: ICD-10-CM

## 2019-11-21 ENCOUNTER — COMMUNICATION - HEALTHEAST (OUTPATIENT)
Dept: FAMILY MEDICINE | Facility: CLINIC | Age: 44
End: 2019-11-21

## 2019-12-06 ENCOUNTER — OFFICE VISIT - HEALTHEAST (OUTPATIENT)
Dept: FAMILY MEDICINE | Facility: CLINIC | Age: 44
End: 2019-12-06

## 2019-12-06 ENCOUNTER — COMMUNICATION - HEALTHEAST (OUTPATIENT)
Dept: FAMILY MEDICINE | Facility: CLINIC | Age: 44
End: 2019-12-06

## 2019-12-06 DIAGNOSIS — Z12.31 VISIT FOR SCREENING MAMMOGRAM: ICD-10-CM

## 2019-12-06 DIAGNOSIS — Z82.49 FAMILY HISTORY OF HEART DISEASE: ICD-10-CM

## 2019-12-06 ASSESSMENT — MIFFLIN-ST. JEOR: SCORE: 1583.9

## 2019-12-10 ENCOUNTER — COMMUNICATION - HEALTHEAST (OUTPATIENT)
Dept: FAMILY MEDICINE | Facility: CLINIC | Age: 44
End: 2019-12-10

## 2019-12-13 ENCOUNTER — HOSPITAL ENCOUNTER (OUTPATIENT)
Dept: CT IMAGING | Facility: CLINIC | Age: 44
Discharge: HOME | End: 2019-12-13
Attending: FAMILY MEDICINE

## 2019-12-13 DIAGNOSIS — Z82.49 FAMILY HISTORY OF HEART DISEASE: ICD-10-CM

## 2019-12-13 LAB
BSA FOR ECHO PROCEDURE: 2.08 M2
CV CALCIUM SCORE AGATSTON LM: 0
CV CALCIUM SCORING AGATSON LAD: 0
CV CALCIUM SCORING AGATSTON CX: 0
CV CALCIUM SCORING AGATSTON RCA: 0
CV CALCIUM SCORING AGATSTON TOTAL: 0

## 2019-12-13 ASSESSMENT — MIFFLIN-ST. JEOR: SCORE: 1583.9

## 2019-12-30 ENCOUNTER — COMMUNICATION - HEALTHEAST (OUTPATIENT)
Dept: FAMILY MEDICINE | Facility: CLINIC | Age: 44
End: 2019-12-30

## 2019-12-30 DIAGNOSIS — F32.81 PMDD (PREMENSTRUAL DYSPHORIC DISORDER): ICD-10-CM

## 2019-12-31 ENCOUNTER — COMMUNICATION - HEALTHEAST (OUTPATIENT)
Dept: FAMILY MEDICINE | Facility: CLINIC | Age: 44
End: 2019-12-31

## 2019-12-31 DIAGNOSIS — F32.81 PMDD (PREMENSTRUAL DYSPHORIC DISORDER): ICD-10-CM

## 2020-01-17 ENCOUNTER — OFFICE VISIT - HEALTHEAST (OUTPATIENT)
Dept: FAMILY MEDICINE | Facility: CLINIC | Age: 45
End: 2020-01-17

## 2020-01-17 DIAGNOSIS — Z00.00 ROUTINE GENERAL MEDICAL EXAMINATION AT A HEALTH CARE FACILITY: ICD-10-CM

## 2020-01-17 DIAGNOSIS — F32.81 PMDD (PREMENSTRUAL DYSPHORIC DISORDER): ICD-10-CM

## 2020-01-17 DIAGNOSIS — E03.8 OTHER SPECIFIED HYPOTHYROIDISM: ICD-10-CM

## 2020-01-17 LAB
ALBUMIN SERPL-MCNC: 4.1 G/DL (ref 3.5–5)
ALP SERPL-CCNC: 98 U/L (ref 45–120)
ALT SERPL W P-5'-P-CCNC: 17 U/L (ref 0–45)
ANION GAP SERPL CALCULATED.3IONS-SCNC: 10 MMOL/L (ref 5–18)
AST SERPL W P-5'-P-CCNC: 17 U/L (ref 0–40)
BILIRUB SERPL-MCNC: 0.5 MG/DL (ref 0–1)
BUN SERPL-MCNC: 16 MG/DL (ref 8–22)
CALCIUM SERPL-MCNC: 9.3 MG/DL (ref 8.5–10.5)
CHLORIDE BLD-SCNC: 105 MMOL/L (ref 98–107)
CHOLEST SERPL-MCNC: 176 MG/DL
CO2 SERPL-SCNC: 25 MMOL/L (ref 22–31)
CREAT SERPL-MCNC: 0.81 MG/DL (ref 0.6–1.1)
ERYTHROCYTE [DISTWIDTH] IN BLOOD BY AUTOMATED COUNT: 11.5 % (ref 11–14.5)
FASTING STATUS PATIENT QL REPORTED: YES
GFR SERPL CREATININE-BSD FRML MDRD: >60 ML/MIN/1.73M2
GLUCOSE BLD-MCNC: 88 MG/DL (ref 70–125)
HCT VFR BLD AUTO: 42 % (ref 35–47)
HDLC SERPL-MCNC: 42 MG/DL
HGB BLD-MCNC: 14.1 G/DL (ref 12–16)
LDLC SERPL CALC-MCNC: 117 MG/DL
MCH RBC QN AUTO: 31.1 PG (ref 27–34)
MCHC RBC AUTO-ENTMCNC: 33.6 G/DL (ref 32–36)
MCV RBC AUTO: 93 FL (ref 80–100)
PLATELET # BLD AUTO: 301 THOU/UL (ref 140–440)
PMV BLD AUTO: 6.5 FL (ref 7–10)
POTASSIUM BLD-SCNC: 4.2 MMOL/L (ref 3.5–5)
PROT SERPL-MCNC: 7 G/DL (ref 6–8)
RBC # BLD AUTO: 4.53 MILL/UL (ref 3.8–5.4)
SODIUM SERPL-SCNC: 140 MMOL/L (ref 136–145)
TRIGL SERPL-MCNC: 83 MG/DL
TSH SERPL DL<=0.005 MIU/L-ACNC: 0.62 UIU/ML (ref 0.3–5)
WBC: 4.8 THOU/UL (ref 4–11)

## 2020-01-17 ASSESSMENT — MIFFLIN-ST. JEOR: SCORE: 1579.36

## 2020-01-19 ENCOUNTER — COMMUNICATION - HEALTHEAST (OUTPATIENT)
Dept: FAMILY MEDICINE | Facility: CLINIC | Age: 45
End: 2020-01-19

## 2020-01-19 LAB — HBA1C MFR BLD: 5.5 % (ref 4.2–6.1)

## 2020-07-20 ENCOUNTER — OFFICE VISIT - HEALTHEAST (OUTPATIENT)
Dept: FAMILY MEDICINE | Facility: CLINIC | Age: 45
End: 2020-07-20

## 2020-07-21 ENCOUNTER — OFFICE VISIT - HEALTHEAST (OUTPATIENT)
Dept: FAMILY MEDICINE | Facility: CLINIC | Age: 45
End: 2020-07-21

## 2020-07-21 DIAGNOSIS — R05.9 COUGH: ICD-10-CM

## 2020-07-22 ENCOUNTER — COMMUNICATION - HEALTHEAST (OUTPATIENT)
Dept: FAMILY MEDICINE | Facility: CLINIC | Age: 45
End: 2020-07-22

## 2020-07-22 DIAGNOSIS — R05.9 COUGH: ICD-10-CM

## 2020-08-09 ENCOUNTER — AMBULATORY - HEALTHEAST (OUTPATIENT)
Dept: FAMILY MEDICINE | Facility: CLINIC | Age: 45
End: 2020-08-09

## 2020-08-09 DIAGNOSIS — R05.9 COUGH: ICD-10-CM

## 2020-08-11 ENCOUNTER — COMMUNICATION - HEALTHEAST (OUTPATIENT)
Dept: SCHEDULING | Facility: CLINIC | Age: 45
End: 2020-08-11

## 2020-08-20 ENCOUNTER — OFFICE VISIT - HEALTHEAST (OUTPATIENT)
Dept: FAMILY MEDICINE | Facility: CLINIC | Age: 45
End: 2020-08-20

## 2020-08-21 ENCOUNTER — OFFICE VISIT - HEALTHEAST (OUTPATIENT)
Dept: FAMILY MEDICINE | Facility: CLINIC | Age: 45
End: 2020-08-21

## 2020-08-21 DIAGNOSIS — R05.3 CHRONIC COUGH: ICD-10-CM

## 2020-08-21 DIAGNOSIS — R05.9 COUGH: ICD-10-CM

## 2020-08-21 DIAGNOSIS — J98.01 BRONCHOSPASM: ICD-10-CM

## 2020-10-28 ENCOUNTER — OFFICE VISIT - HEALTHEAST (OUTPATIENT)
Dept: FAMILY MEDICINE | Facility: CLINIC | Age: 45
End: 2020-10-28

## 2020-10-28 ENCOUNTER — COMMUNICATION - HEALTHEAST (OUTPATIENT)
Dept: FAMILY MEDICINE | Facility: CLINIC | Age: 45
End: 2020-10-28

## 2020-10-28 DIAGNOSIS — R05.9 COUGH: ICD-10-CM

## 2020-10-28 ASSESSMENT — PATIENT HEALTH QUESTIONNAIRE - PHQ9: SUM OF ALL RESPONSES TO PHQ QUESTIONS 1-9: 0

## 2020-10-29 ENCOUNTER — COMMUNICATION - HEALTHEAST (OUTPATIENT)
Dept: FAMILY MEDICINE | Facility: CLINIC | Age: 45
End: 2020-10-29

## 2020-10-29 DIAGNOSIS — R05.9 COUGH: ICD-10-CM

## 2020-11-19 ENCOUNTER — COMMUNICATION - HEALTHEAST (OUTPATIENT)
Dept: FAMILY MEDICINE | Facility: CLINIC | Age: 45
End: 2020-11-19

## 2020-11-19 DIAGNOSIS — R05.9 COUGH: ICD-10-CM

## 2020-11-19 DIAGNOSIS — F32.81 PMDD (PREMENSTRUAL DYSPHORIC DISORDER): ICD-10-CM

## 2020-12-20 ENCOUNTER — HEALTH MAINTENANCE LETTER (OUTPATIENT)
Age: 45
End: 2020-12-20

## 2021-01-19 ENCOUNTER — OFFICE VISIT - HEALTHEAST (OUTPATIENT)
Dept: FAMILY MEDICINE | Facility: CLINIC | Age: 46
End: 2021-01-19

## 2021-01-19 DIAGNOSIS — E66.811 CLASS 1 OBESITY WITH SERIOUS COMORBIDITY AND BODY MASS INDEX (BMI) OF 34.0 TO 34.9 IN ADULT, UNSPECIFIED OBESITY TYPE: ICD-10-CM

## 2021-01-19 DIAGNOSIS — R05.8 POST-VIRAL COUGH SYNDROME: ICD-10-CM

## 2021-01-19 DIAGNOSIS — E03.8 OTHER SPECIFIED HYPOTHYROIDISM: ICD-10-CM

## 2021-01-19 DIAGNOSIS — F32.81 PMDD (PREMENSTRUAL DYSPHORIC DISORDER): ICD-10-CM

## 2021-01-19 DIAGNOSIS — Z00.00 ROUTINE GENERAL MEDICAL EXAMINATION AT A HEALTH CARE FACILITY: ICD-10-CM

## 2021-01-19 DIAGNOSIS — H01.134 ECZEMATOUS DERMATITIS OF UPPER EYELIDS OF BOTH EYES: ICD-10-CM

## 2021-01-19 DIAGNOSIS — H01.131 ECZEMATOUS DERMATITIS OF UPPER EYELIDS OF BOTH EYES: ICD-10-CM

## 2021-01-19 LAB
ALBUMIN SERPL-MCNC: 4.4 G/DL (ref 3.5–5)
ALP SERPL-CCNC: 75 U/L (ref 45–120)
ALT SERPL W P-5'-P-CCNC: 15 U/L (ref 0–45)
ANION GAP SERPL CALCULATED.3IONS-SCNC: 9 MMOL/L (ref 5–18)
AST SERPL W P-5'-P-CCNC: 18 U/L (ref 0–40)
BILIRUB SERPL-MCNC: 0.5 MG/DL (ref 0–1)
BUN SERPL-MCNC: 19 MG/DL (ref 8–22)
CALCIUM SERPL-MCNC: 9 MG/DL (ref 8.5–10.5)
CHLORIDE BLD-SCNC: 103 MMOL/L (ref 98–107)
CHOLEST SERPL-MCNC: 163 MG/DL
CO2 SERPL-SCNC: 24 MMOL/L (ref 22–31)
CREAT SERPL-MCNC: 0.81 MG/DL (ref 0.6–1.1)
ERYTHROCYTE [DISTWIDTH] IN BLOOD BY AUTOMATED COUNT: 12.2 % (ref 11–14.5)
FASTING STATUS PATIENT QL REPORTED: YES
GFR SERPL CREATININE-BSD FRML MDRD: >60 ML/MIN/1.73M2
GLUCOSE BLD-MCNC: 89 MG/DL (ref 70–125)
HCT VFR BLD AUTO: 40.6 % (ref 35–47)
HDLC SERPL-MCNC: 42 MG/DL
HGB BLD-MCNC: 13.7 G/DL (ref 12–16)
LDLC SERPL CALC-MCNC: 102 MG/DL
MCH RBC QN AUTO: 30.9 PG (ref 27–34)
MCHC RBC AUTO-ENTMCNC: 33.7 G/DL (ref 32–36)
MCV RBC AUTO: 92 FL (ref 80–100)
PLATELET # BLD AUTO: 310 THOU/UL (ref 140–440)
PMV BLD AUTO: 6.4 FL (ref 7–10)
POTASSIUM BLD-SCNC: 4.4 MMOL/L (ref 3.5–5)
PROT SERPL-MCNC: 7 G/DL (ref 6–8)
RBC # BLD AUTO: 4.42 MILL/UL (ref 3.8–5.4)
SODIUM SERPL-SCNC: 136 MMOL/L (ref 136–145)
TRIGL SERPL-MCNC: 96 MG/DL
TSH SERPL DL<=0.005 MIU/L-ACNC: 1.53 UIU/ML (ref 0.3–5)
WBC: 5.5 THOU/UL (ref 4–11)

## 2021-01-19 ASSESSMENT — ANXIETY QUESTIONNAIRES
3. WORRYING TOO MUCH ABOUT DIFFERENT THINGS: NOT AT ALL
6. BECOMING EASILY ANNOYED OR IRRITABLE: NOT AT ALL
2. NOT BEING ABLE TO STOP OR CONTROL WORRYING: NOT AT ALL
4. TROUBLE RELAXING: NOT AT ALL
GAD7 TOTAL SCORE: 0
5. BEING SO RESTLESS THAT IT IS HARD TO SIT STILL: NOT AT ALL
7. FEELING AFRAID AS IF SOMETHING AWFUL MIGHT HAPPEN: NOT AT ALL
1. FEELING NERVOUS, ANXIOUS, OR ON EDGE: NOT AT ALL

## 2021-01-19 ASSESSMENT — MIFFLIN-ST. JEOR: SCORE: 1552.93

## 2021-01-20 LAB
HPV SOURCE: NORMAL
HUMAN PAPILLOMA VIRUS 16 DNA: NEGATIVE
HUMAN PAPILLOMA VIRUS 18 DNA: NEGATIVE
HUMAN PAPILLOMA VIRUS FINAL DIAGNOSIS: NORMAL
HUMAN PAPILLOMA VIRUS OTHER HR: NEGATIVE
SPECIMEN DESCRIPTION: NORMAL

## 2021-01-22 LAB — HBA1C MFR BLD: 5.2 %

## 2021-01-29 LAB
BKR LAB AP ABNORMAL BLEEDING: NO
BKR LAB AP BIRTH CONTROL/HORMONES: ABNORMAL
BKR LAB AP CERVICAL APPEARANCE: NORMAL
BKR LAB AP GYN ADEQUACY: ABNORMAL
BKR LAB AP GYN INTERPRETATION: ABNORMAL
BKR LAB AP HPV REFLEX: ABNORMAL
BKR LAB AP LMP: ABNORMAL
BKR LAB AP PATIENT STATUS: ABNORMAL
BKR LAB AP PREVIOUS ABNORMAL: NO
BKR LAB AP PREVIOUS NORMAL: 2016
HIGH RISK?: NO
PATH REPORT.COMMENTS IMP SPEC: ABNORMAL
RESULT FLAG (HE HISTORICAL CONVERSION): ABNORMAL

## 2021-02-08 ENCOUNTER — RECORDS - HEALTHEAST (OUTPATIENT)
Dept: ADMINISTRATIVE | Facility: OTHER | Age: 46
End: 2021-02-08

## 2021-02-28 ENCOUNTER — HEALTH MAINTENANCE LETTER (OUTPATIENT)
Age: 46
End: 2021-02-28

## 2021-03-17 ENCOUNTER — COMMUNICATION - HEALTHEAST (OUTPATIENT)
Dept: FAMILY MEDICINE | Facility: CLINIC | Age: 46
End: 2021-03-17

## 2021-03-17 DIAGNOSIS — Z91.030 BEE ALLERGY STATUS: ICD-10-CM

## 2021-03-22 ENCOUNTER — OFFICE VISIT - HEALTHEAST (OUTPATIENT)
Dept: FAMILY MEDICINE | Facility: CLINIC | Age: 46
End: 2021-03-22

## 2021-03-22 DIAGNOSIS — Z20.822 SUSPECTED COVID-19 VIRUS INFECTION: ICD-10-CM

## 2021-03-23 ENCOUNTER — AMBULATORY - HEALTHEAST (OUTPATIENT)
Dept: FAMILY MEDICINE | Facility: CLINIC | Age: 46
End: 2021-03-23

## 2021-03-23 ENCOUNTER — COMMUNICATION - HEALTHEAST (OUTPATIENT)
Dept: FAMILY MEDICINE | Facility: CLINIC | Age: 46
End: 2021-03-23

## 2021-03-23 DIAGNOSIS — Z20.822 SUSPECTED COVID-19 VIRUS INFECTION: ICD-10-CM

## 2021-03-23 LAB
SARS-COV-2 PCR COMMENT: NORMAL
SARS-COV-2 RNA SPEC QL NAA+PROBE: NEGATIVE
SARS-COV-2 VIRUS SPECIMEN SOURCE: NORMAL

## 2021-03-24 ENCOUNTER — COMMUNICATION - HEALTHEAST (OUTPATIENT)
Dept: SCHEDULING | Facility: CLINIC | Age: 46
End: 2021-03-24

## 2021-04-02 ENCOUNTER — AMBULATORY - HEALTHEAST (OUTPATIENT)
Dept: NURSING | Facility: CLINIC | Age: 46
End: 2021-04-02

## 2021-04-23 ENCOUNTER — AMBULATORY - HEALTHEAST (OUTPATIENT)
Dept: NURSING | Facility: CLINIC | Age: 46
End: 2021-04-23

## 2021-04-29 ENCOUNTER — RECORDS - HEALTHEAST (OUTPATIENT)
Dept: ADMINISTRATIVE | Facility: OTHER | Age: 46
End: 2021-04-29

## 2021-05-14 ENCOUNTER — RECORDS - HEALTHEAST (OUTPATIENT)
Dept: ADMINISTRATIVE | Facility: OTHER | Age: 46
End: 2021-05-14

## 2021-05-27 ASSESSMENT — PATIENT HEALTH QUESTIONNAIRE - PHQ9: SUM OF ALL RESPONSES TO PHQ QUESTIONS 1-9: 0

## 2021-05-28 ASSESSMENT — ANXIETY QUESTIONNAIRES: GAD7 TOTAL SCORE: 0

## 2021-05-30 VITALS — WEIGHT: 189 LBS | BODY MASS INDEX: 31.49 KG/M2 | HEIGHT: 65 IN

## 2021-05-31 VITALS — WEIGHT: 191.9 LBS | BODY MASS INDEX: 31.93 KG/M2

## 2021-05-31 VITALS — BODY MASS INDEX: 31.82 KG/M2 | HEIGHT: 65 IN | WEIGHT: 191 LBS

## 2021-05-31 NOTE — TELEPHONE ENCOUNTER
Dr. Aleman-  See pt's [employer] message and reply via [employer].  Printed wellness results for you, they are in your inbox.  Pt has appt scheduled with you on 8-6-19.

## 2021-05-31 NOTE — PROGRESS NOTES
Assessment/ Plan     1. PMDD (premenstrual dysphoric disorder)  Patient is having fairly severe premenstrual dysphoric disorder to the point where it is affecting her quality of life.  After discussion of options, she would like to try fluoxetine.  We will start at 10 mg.  She will let me know after several cycles if she is having any improvement in her symptoms.  Discussed we can increase the dose after 4 to 6 weeks if needed.  Reviewed potential adverse side effects.  - FLUoxetine (PROZAC) 10 MG capsule; Take 1 capsule (10 mg total) by mouth daily.  Dispense: 30 capsule; Refill: 2    2. Other specified hypothyroidism  Patient is due for TSH.  She is been fairly stable.  - Thyroid Stimulating Hormone (TSH)    3. Elevated BP without diagnosis of hypertension  Patient has had some borderline elevated diastolic blood pressure readings.  She is normal today.  She has concerns that she has a strong family history of heart disease and strokes.  She states her mom just had another stroke.  We discussed the best lifestyle modification for prevention would be exercise and weight loss.  She plans on starting this soon.  We reviewed her cholesterol results from her work screening which was normal except for a slightly low HDL.  We will not start any antihypertensives at this time but will continue to follow as she plans on making lifestyle modification.  If she is consistently running high despite making these changes, could we discussed starting a low-dose antihypertensive.      Subjective:       Katie Santoro is a 43 y.o. female who presents for discussion of PMS.  She states for the week before her menses she really struggles.  She is very irritable and short tempered.  She feels really emotional.  She feels like it is affecting everyone around her.  She does feel like it is bad enough that she would take a medication for it.  She had been on birth control pills in the past but it made her feel really infante, so does not  want to consider any type of hormones at this time.  When she gets her menses, her symptoms slowly improved.  The rest of the month she is not having any depression or anxiety.  She had shoulder surgery in January and has had some difficulty with recovery.  She currently has frozen shoulder and is getting steroid injections and doing physical therapy.  This is affected her ability to exercise.  She has a very strong family history of heart disease and stroke.  She wanted to review her cholesterol results from work which are excellent except for slightly low HDL.  We discussed exercise and plant-based fats may increase that.  We discussed different diets and I would recommend the Mediterranean diet as 1 of the healthier choices.  We discussed lifestyle modification for heart disease and stroke risk.  Would recommend exercise and weight loss is the #1 thing she can do for herself.  She had some slightly elevated diastolic blood pressures when she is had this checked at work.  This can run anywhere between 80-90.  This may come down with diet and weight loss.  She can continue to monitor and we could certainly treat a little bit more aggressively her blood pressure if she would like.  She has hypothyroidism and is overdue for labs today.  She also wants me to recheck her lungs.  She had a cough that lasted about 6 weeks and finally went into urgent care and they treated her for bronchitis.  She states whenever she gets a virus she tends to have this prolonged cough.  We discussed she may have a component of reactive airway disease.  If this happens again, would come in around the 3-week bing for evaluation.    Relevant past medical, family, surgical, and social history reviewed with patient, unless noted in HPI, not pertinent for this visit.  Medications were discussed and reconciled.   Review of Systems   A 12 point comprehensive review of systems was negative except as noted.      Current Outpatient Medications  "  Medication Sig Dispense Refill     BIOTIN ORAL Take by mouth.       cholecalciferol, vitamin D3, (VITAMIN D3 ORAL) Take 5,000 Units by mouth.       levothyroxine (SYNTHROID, LEVOTHROID) 137 MCG tablet Take 1 tablet (137 mcg total) by mouth daily. 90 tablet 1     MAGNESIUM ORAL Take by mouth.       POTASSIUM ORAL Take by mouth. Unsure of dose       FLUoxetine (PROZAC) 10 MG capsule Take 1 capsule (10 mg total) by mouth daily. 30 capsule 2     No current facility-administered medications for this visit.        Objective:      /72   Pulse 76   Temp 98.4  F (36.9  C)   Resp 16   Ht 5' 5\" (1.651 m)   Wt 205 lb (93 kg)   LMP 08/01/2019   BMI 34.11 kg/m        General appearance: alert, appears stated age and cooperative  Good eye contact and social smile, speech is normal in fluency and content.  Lungs: clear to auscultation bilaterally  Heart: regular rate and rhythm, S1, S2 normal, no murmur, click, rub or gallop  Extremities: extremities normal, atraumatic, no cyanosis or edema  Pulses: 2+ and symmetric    No results found for this or any previous visit (from the past 168 hour(s)).       This note has been dictated using voice recognition software. Any grammatical or context distortions are unintentional and inherent to the software  "

## 2021-06-01 VITALS — BODY MASS INDEX: 30.32 KG/M2 | WEIGHT: 182 LBS | HEIGHT: 65 IN

## 2021-06-01 NOTE — TELEPHONE ENCOUNTER
Dr. Aleman-  See pt's [employer] message and reply via [employer].  Last OV note on 8/16/19 states:    1. PMDD (premenstrual dysphoric disorder)  Patient is having fairly severe premenstrual dysphoric disorder to the point where it is affecting her quality of life.  After discussion of options, she would like to try fluoxetine.  We will start at 10 mg.  She will let me know after several cycles if she is having any improvement in her symptoms.  Discussed we can increase the dose after 4 to 6 weeks if needed.  Reviewed potential adverse side effects.  - FLUoxetine (PROZAC) 10 MG capsule; Take 1 capsule (10 mg total) by mouth daily.  Dispense: 30 capsule; Refill: 2

## 2021-06-02 VITALS — BODY MASS INDEX: 32.99 KG/M2 | HEIGHT: 65 IN | WEIGHT: 198 LBS

## 2021-06-03 VITALS — BODY MASS INDEX: 34.16 KG/M2 | WEIGHT: 205 LBS | HEIGHT: 65 IN

## 2021-06-04 VITALS
SYSTOLIC BLOOD PRESSURE: 110 MMHG | HEIGHT: 65 IN | HEART RATE: 72 BPM | DIASTOLIC BLOOD PRESSURE: 62 MMHG | WEIGHT: 208 LBS | BODY MASS INDEX: 34.66 KG/M2 | RESPIRATION RATE: 16 BRPM

## 2021-06-04 VITALS — BODY MASS INDEX: 34.82 KG/M2 | HEIGHT: 65 IN | WEIGHT: 209 LBS

## 2021-06-04 VITALS
BODY MASS INDEX: 34.82 KG/M2 | RESPIRATION RATE: 16 BRPM | SYSTOLIC BLOOD PRESSURE: 120 MMHG | WEIGHT: 209 LBS | DIASTOLIC BLOOD PRESSURE: 68 MMHG | HEART RATE: 88 BPM | HEIGHT: 65 IN

## 2021-06-04 NOTE — TELEPHONE ENCOUNTER
Who is calling:  Patient    Reason for Call:    Status of med request?  Patient is out of this med and doesn't want to stop and start therapy.    Date of last appointment with primary care: 12/06/19    Okay to leave a detailed message: Yes    Please send request to [facility], [place], [place]

## 2021-06-04 NOTE — PROGRESS NOTES
Assessment/ Plan     1. Family history of heart disease  Patient has concerns about her family's heart history.  She has had several family members with coronary artery disease, but none were in their younger years.  Her mother just had strokes and they found what I am assuming is a PFO, although patient does not have all the details at this point.  Her mother is seeing her cardiologist in the next couple of days and she will try to find out more information.  I discussed with patient that PFO's are extremely common in the general population and would not necessarily be something that we would screen for in an asymptomatic person.  She has no murmur heard on exam today and no symptoms of chest pain or shortness of breath.  We discussed the best thing that she can do is minimize risk factors.  Her cholesterol is normal but her HDL is low and she has stage I obesity.  Recommend that she start exercising regularly, eat a healthy diet, and work on weight loss is the best thing she can do to minimize her risk.  She does not smoke and does not have diabetes.  As she feels very anxious about developing heart disease, she would like to see what her calcium CT score is.  Discussed with her her insurance may not cover this so it may be out-of-pocket.  She is willing to pay for it if it is not covered.  - CT Cardiac Calcium Score; Future    2. Visit for screening mammogram  Reminded her that she is due for mammogram.  - Mammo Screening Bilateral; Future      Subjective:       Katie Santoro is a 43 y.o. female who presents for discussion of family history.  She is very worried because she feels like she has a lot of coronary artery disease in her family.  She states that her father  of heart attack at age 68.  Her paternal grandfather also had a heart attack but was in his 60s or 70s.  Her maternal grandfather had A. fib and an MI.  Her mom has had hypertension elevated cholesterol and recently had a stroke.  In the  "work-up of her stroke, they told her she \"has a hole in her heart\".  I am assuming they likely found a PFO although patient does not know the details of this.  Her mom is seeing her cardiologist today.  I asked that she have her mom talk to her cardiologist if any of her kids need to be screened.  Discussed with patient that PFO's are extremely common in the general population.  We typically do not screen for it in asymptomatic individuals.  We have talked in the past about risk modification.  She is a never smoker and does not have diabetes.  She has had some borderline blood pressures in the past but today it looks normal.  She is currently not exercising and we discussed starting an exercise program.  She has a low HDL and that might help increase it.  She also has stage I obesity.  Following a healthy diet and losing some weight would be helpful as well.  She is aware of that she just has not gotten around to starting this yet.    Relevant past medical, family, surgical, and social history reviewed with patient, unless noted in HPI, not pertinent for this visit.  Medications were discussed and reconciled.   Review of Systems   A 12 point comprehensive review of systems was negative except as noted.      Current Outpatient Medications   Medication Sig Dispense Refill     BIOTIN ORAL Take by mouth.       cholecalciferol, vitamin D3, (VITAMIN D3 ORAL) Take 5,000 Units by mouth.       FLUoxetine (PROZAC) 20 MG capsule Take 1 capsule (20 mg total) by mouth daily. 30 capsule 2     levothyroxine (SYNTHROID, LEVOTHROID) 137 MCG tablet TAKE ONE TABLET BY MOUTH ONCE DAILY 90 tablet 1     MAGNESIUM ORAL Take by mouth.       No current facility-administered medications for this visit.        Objective:      /68   Pulse 88   Resp 16   Ht 5' 5\" (1.651 m)   Wt 209 lb (94.8 kg)   LMP 11/22/2019   BMI 34.78 kg/m        General appearance: alert, appears stated age and cooperative  Lungs: clear to auscultation " bilaterally  Heart: regular rate and rhythm, S1, S2 normal, no murmur, click, rub or gallop    No results found for this or any previous visit (from the past 168 hour(s)).       This note has been dictated using voice recognition software. Any grammatical or context distortions are unintentional and inherent to the software

## 2021-06-05 VITALS
DIASTOLIC BLOOD PRESSURE: 71 MMHG | RESPIRATION RATE: 16 BRPM | HEIGHT: 65 IN | SYSTOLIC BLOOD PRESSURE: 125 MMHG | HEART RATE: 71 BPM | BODY MASS INDEX: 33.82 KG/M2 | WEIGHT: 203 LBS

## 2021-06-05 NOTE — PROGRESS NOTES
Assessment/ Plan     1. Routine general medical examination at a health care facility  As healthcare maintenance, she had a normal Pap smear in 2017.  She would like to do fasting blood work today.  We will add an A1c due to class I obesity.  She had a calcium CT score done this year due to some family history of heart disease and it was 0.  This is reassuring.  She plans on trying to get back into regular exercise.  She has had a low HDL in the past and we discussed some measures that might be helpful such as exercise and plant-based sources of fats.  - Comprehensive Metabolic Panel  - Glycosylated Hemoglobin A1c  - HM2(CBC w/o Differential)  - Lipid Cascade    2. Other specified hypothyroidism  Her thyroid has been fairly stable.  She is due for TSH today.  - levothyroxine (SYNTHROID, LEVOTHROID) 137 MCG tablet; Take 1 tablet (137 mcg total) by mouth daily.  Dispense: 90 tablet; Refill: 3  - Thyroid Stimulating Hormone (TSH)    3. PMDD (premenstrual dysphoric disorder)  The fluoxetine is working really well for her PMDD.  She would like to stay on it.  Refills are sent for 1 year.  - FLUoxetine (PROZAC) 20 MG capsule; Take 1 capsule (20 mg total) by mouth daily.  Dispense: 90 capsule; Refill: 2      Subjective:     Katie Santoro is a 44 y.o. female who presents for an annual exam.  Overall, she has been doing fairly well.  I saw her a couple of months ago for discussion of some family history of some heart disease.  She has been quite worried about it so we sent her for a calcium CT score which was 0.  This is very reassuring.  We discussed continuing with good lifestyle choices.  She does plan on trying to get back into exercise and lose some weight.  She is eating fairly healthy.  She has a non-smoker.  She has hypothyroidism which is been quite stable on her current dose of medication.  She started fluoxetine for premenstrual dysphoric disorder and it is working very well for her.  She would like to stay on  it at the current dose.  She is up-to-date on Pap smear and mammogram.    Healthy Habits:   Regular Exercise: No  Sunscreen Use: Yes  Healthy Diet: Yes and No  Dental Visits Regularly: Yes  Seat Belt: Yes  Sexually active: Yes  Self Breast Exam Monthly:Yes  Colonoscopy: No  Lipid Profile: Yes  Glucose Screen: Yes  Prevention of Osteoporosis: Yes  Last Dexa: No        Immunization History   Administered Date(s) Administered     Influenza, inj, historic,unspecified 10/01/2019     Influenza,seasonal quad, PF, =/> 6months 2014, 09/15/2016     Influenza,seasonal,quad inj =/> 6months 2015     Td,adult,historic,unspecified 1973     Tdap 2015     Immunization status: up to date and documented.     Gynecologic History  Patient's last menstrual period was 2020.  Contraception: none  Last Pap: . Results were: normal  Last mammogram: . Results were: normal      OB History    Para Term  AB Living   3 1   1 1 2   SAB TAB Ectopic Multiple Live Births   1       2      # Outcome Date GA Lbr Hugh/2nd Weight Sex Delivery Anes PTL Lv   3  06/15/15 36w5d  6 lb 7.7 oz (2.94 kg) F CS-LTranv   JACQUE   2 SAB            1          JACQUE       Current Outpatient Medications   Medication Sig Dispense Refill     BIOTIN ORAL Take by mouth.       cholecalciferol, vitamin D3, (VITAMIN D3 ORAL) Take 5,000 Units by mouth.       FLUoxetine (PROZAC) 20 MG capsule Take 1 capsule (20 mg total) by mouth daily. 90 capsule 2     levothyroxine (SYNTHROID, LEVOTHROID) 137 MCG tablet Take 1 tablet (137 mcg total) by mouth daily. 90 tablet 3     MAGNESIUM ORAL Take by mouth.       No current facility-administered medications for this visit.      Past Medical History:   Diagnosis Date     Disease of thyroid gland      Past Surgical History:   Procedure Laterality Date      SECTION       SHOULDER SURGERY       Venom-honey bee; Codeine; and Pine tar  Family History   Problem Relation Age of  Onset     Hypothyroidism Mother      Hypertension Mother      Heart disease Father      Hypertension Brother      Hyperlipidemia Brother      Hypothyroidism Maternal Aunt      Hypertension Maternal Aunt      Hypothyroidism Maternal Grandmother      Heart disease Paternal Grandfather      Social History     Socioeconomic History     Marital status:      Spouse name: Not on file     Number of children: Not on file     Years of education: Not on file     Highest education level: Not on file   Occupational History     Not on file   Social Needs     Financial resource strain: Not on file     Food insecurity:     Worry: Not on file     Inability: Not on file     Transportation needs:     Medical: Not on file     Non-medical: Not on file   Tobacco Use     Smoking status: Never Smoker     Smokeless tobacco: Never Used   Substance and Sexual Activity     Alcohol use: No     Drug use: No     Sexual activity: Yes     Partners: Male   Lifestyle     Physical activity:     Days per week: Not on file     Minutes per session: Not on file     Stress: Not on file   Relationships     Social connections:     Talks on phone: Not on file     Gets together: Not on file     Attends Confucianist service: Not on file     Active member of club or organization: Not on file     Attends meetings of clubs or organizations: Not on file     Relationship status: Not on file     Intimate partner violence:     Fear of current or ex partner: Not on file     Emotionally abused: Not on file     Physically abused: Not on file     Forced sexual activity: Not on file   Other Topics Concern     Not on file   Social History Narrative     Not on file       Review of Systems  General:  Denies problems  Eyes:  Denies problems  Ears/Nose/Throat:  Denies problems  Cardiovascular:  Denies problems  Respiratory:  Denies problems  Gastrointestinal:  Denies problems  Genitourinary:  Denies problems  Musculoskeletal:  Denies problems  Skin:  Denies  "problems  Neurologic:  Denies problems  Psychiatric:  Denies problems  Endocrine:  Denies problems  Heme/Lymphatic:  Denies problems  Allergic/Immunologic:  Denies problems    Objective:      Vitals:    01/17/20 0743   BP: 110/62   Pulse: 72   Resp: 16   Weight: 208 lb (94.3 kg)   Height: 5' 5\" (1.651 m)       Physical Exam:  General Appearance: Alert, cooperative, no distress, appears stated age  Head: Normocephalic, without obvious abnormality, atraumatic  Eyes: PERRL, conjunctiva/corneas clear, EOM's intact  Ears: Normal TM's and external ear canals, both ears  Nose: Nares normal, septum midline,mucosa normal, no drainage  Throat: Lips, mucosa, and tongue normal; teeth and gums normal  Neck: Supple, symmetrical, trachea midline, no adenopathy; thyroid: not enlarged, symmetric, no tenderness/mass/nodules; no carotid bruit  Back: Symmetric, no curvature, ROM normal, no CVA tenderness  Lungs: Clear to auscultation bilaterally, respirations unlabored  Breasts: No breast masses, tenderness, asymmetry, or nipple discharge  Heart: Regular rate and rhythm, S1 and S2 normal, no murmur, rub, or gallop, Abdomen: Soft, non-tender, bowel sounds active all four quadrants,  no masses, no organomegaly  Extremities: Extremities normal, atraumatic, no cyanosis or edema  Skin: Skin color, texture, turgor normal, no rashes or lesions  Lymph nodes: Cervical, supraclavicular, and axillary nodes normal  Neurologic: Normal        The following high BMI interventions were performed this visit: encouragement to exercise and dietary needs education    Results for orders placed or performed during the hospital encounter of 12/13/19   CT Cardiac Calcium Score   Result Value Ref Range    BSA 2.08 m2    Agatston Score of Left Main 0     Agatston Score of the Left Anterior Descending 0     Agatston Score of Circumflex 0     Agatston Score of Right Coronary Artery 0     Total Score 0.00        Yovana Aleman MD    This note has been dictated " using voice recognition software. Any grammatical or context distortions are unintentional and inherent to the software

## 2021-06-08 NOTE — PROGRESS NOTES
Assessment/ Plan     1. Routine general medical examination at a health care facility  As far as healthcare maintenance, she is up-to-date on immunizations.  We will do her Pap smear today.  She is not to her mammogram for another 2 years.  We will do fasting labs as she has a strong family history for hypertension, high cholesterol, and stroke.  Encouraged her on her exercise and weight loss journey.  - Comprehensive Metabolic Panel  - Gynecologic Cytology (PAP Smear)  - HM2(CBC w/o Differential)  - Lipid Cascade  - Thyroid Stimulating Hormone (TSH)  - Vitamin D, Total (25-Hydroxy)    2. Other specified hypothyroidism  We will recheck her levels today.  - Thyroid Stimulating Hormone (TSH)    Subjective:     Katie Santoro is a 41 y.o. female who presents for an annual exam.  Overall, she has been doing quite well.  She does admit to gaining some weight over the holidays.  She just joined weight watchers in the gym.  She can is try getting more active.  Her concerns is that she has a strong family history of hypertension, high cholesterol, and strokes.  She states her mom just had a stroke about 6 months ago.  She herself has only had hypertension following 1 of her pregnancies.  We discussed the best thing she can do over the long-term is to maintain a healthy weight, exercise, and healthy diet.  Her only major medical problem is hypothyroidism.  She had 2 prior C-sections.    Healthy Habits:   Regular Exercise: Yes  Sunscreen Use: Yes  Healthy Diet: Yes  Dental Visits Regularly: Yes  Seat Belt: Yes  Sexually active: Yes  Self Breast Exam Monthly:Yes  Colonoscopy: No  Lipid Profile: Yes  Glucose Screen: Yes  Prevention of Osteoporosis: Yes  Last Dexa: No        Immunization History   Administered Date(s) Administered     Influenza, seasonal,quad inj 36+ mos 11/17/2015     Td, historic 1973     Tdap 06/03/2015     Immunization status: up to date and documented.     Gynecologic History  No LMP  recorded.  Contraception: vasectomy  Last Pap: . Results were: normal  Last mammogram: na.      OB History    Para Term  AB SAB TAB Ectopic Multiple Living   3 1  1 1 1    2      # Outcome Date GA Lbr Hugh/2nd Weight Sex Delivery Anes PTL Lv   3  06/15/15 36w5d  6 lb 7.7 oz (2.94 kg) F CS-LTranv   Y   2 SAB            1          Y          Current Outpatient Prescriptions   Medication Sig Dispense Refill     levothyroxine (SYNTHROID, LEVOTHROID) 125 MCG tablet Take 1 tablet (125 mcg total) by mouth daily. 90 tablet 3     No current facility-administered medications for this visit.      Past Medical History   Diagnosis Date     Disease of thyroid gland      Past Surgical History   Procedure Laterality Date      section       Shoulder surgery       Codeine  Family History   Problem Relation Age of Onset     Hypothyroidism Mother      Hypertension Mother      Heart disease Father      Hypertension Brother      Hyperlipidemia Brother      Hypothyroidism Maternal Aunt      Hypertension Maternal Aunt      Hypothyroidism Maternal Grandmother      Heart disease Paternal Grandfather      Social History     Social History     Marital status:      Spouse name: N/A     Number of children: N/A     Years of education: N/A     Occupational History     Not on file.     Social History Main Topics     Smoking status: Never Smoker     Smokeless tobacco: Never Used     Alcohol use No     Drug use: No     Sexual activity: Yes     Partners: Male     Other Topics Concern     Not on file     Social History Narrative       Review of Systems  General:  Denies problems  Eyes:  Denies problems  Ears/Nose/Throat:  Denies problems  Cardiovascular:  Denies problems  Respiratory:  Denies problems  Gastrointestinal:  Denies problems  Genitourinary:  Denies problems  Musculoskeletal:  Denies problems  Skin:  Denies problems  Neurologic:  Denies problems  Psychiatric:  Denies problems  Endocrine:   "Denies problems  Heme/Lymphatic:  Denies problems  Allergic/Immunologic:  Denies problems    Objective:      Vitals:    01/12/17 0731   BP: 112/68   Pulse: 62   Resp: 14   Temp: 98.2  F (36.8  C)   TempSrc: Oral   Weight: 189 lb (85.7 kg)   Height: 5' 5\" (1.651 m)       Physical Exam:  General Appearance: Alert, cooperative, no distress, appears stated age  Head: Normocephalic, without obvious abnormality, atraumatic  Eyes: PERRL, conjunctiva/corneas clear, EOM's intact  Ears: Normal TM's and external ear canals, both ears  Nose: Nares normal, septum midline,mucosa normal, no drainage  Throat: Lips, mucosa, and tongue normal; teeth and gums normal  Neck: Supple, symmetrical, trachea midline, no adenopathy; thyroid: not enlarged, symmetric, no tenderness/mass/nodules; no carotid bruit  Back: Symmetric, no curvature, ROM normal, no CVA tenderness  Lungs: Clear to auscultation bilaterally, respirations unlabored  Breasts: No breast masses, tenderness, asymmetry, or nipple discharge  Heart: Regular rate and rhythm, S1 and S2 normal, no murmur, rub, or gallop, Abdomen: Soft, non-tender, bowel sounds active all four quadrants,  no masses, no organomegaly  Pelvic: Normally developed genitalia with no external lesions or eruptions. Vagina and cervix show no lesions, inflammation, discharge or tenderness. Uterus normal to palpation.  No adnexal mass or tenderness.  Extremities: Extremities normal, atraumatic, no cyanosis or edema  Skin: Skin color, texture, turgor normal, no rashes or lesions  Lymph nodes: Cervical, supraclavicular, and axillary nodes normal  Neurologic: Normal        The following high BMI interventions were performed this visit: encouragement to exercise and dietary needs education    Results for orders placed or performed in visit on 01/12/17   HM2(CBC w/o Differential)   Result Value Ref Range    WBC 4.9 4.0 - 11.0 thou/uL    RBC 4.09 3.80 - 5.40 mill/uL    Hemoglobin 12.7 12.0 - 16.0 g/dL    Hematocrit " 37.2 35.0 - 47.0 %    MCV 91 80 - 100 fL    MCH 31.1 27.0 - 34.0 pg    MCHC 34.1 32.0 - 36.0 g/dL    RDW 11.6 11.0 - 14.5 %    Platelets 276 140 - 440 thou/uL    MPV 6.5 (L) 7.0 - 10.0 fL       Yovana Aleman MD    This note has been dictated using voice recognition software. Any grammatical or context distortions are unintentional and inherent to the software

## 2021-06-09 NOTE — PROGRESS NOTES
"Katie Santoro is a 44 y.o. female who is being evaluated via a billable video visit.      The patient has been notified of following:     \"This video visit will be conducted via a call between you and your physician/provider. We have found that certain health care needs can be provided without the need for an in-person physical exam.  This service lets us provide the care you need with a video conversation.  If a prescription is necessary we can send it directly to your pharmacy.  If lab work is needed we can place an order for that and you can then stop by our lab to have the test done at a later time.    Video visits are billed at different rates depending on your insurance coverage. Please reach out to your insurance provider with any questions.    If during the course of the call the physician/provider feels a video visit is not appropriate, you will not be charged for this service.\"    Patient has given verbal consent to a Video visit? Yes  How would you like to obtain your AVS? AVS Preference: [employer].  If dropped by the video visit, the video invitation should be sent to: Other e-mail: Appointedd  Will anyone else be joining your video visit? No        Video Start Time: 2:06 PM    Video-Visit Details    Type of service:  Video Visit    Video End Time (time video stopped): 2:12 PM  Originating Location (pt. Location): Home    Distant Location (provider location):  [facility]     Platform used for Video Visit: [facility]    Name: Katie Santoro  : 1975   MRN: [id number]    Katie Santoro is a 44 y.o. female presenting to discuss the following:     CC:   Chief Complaint   Patient presents with     Cough       HPI:  Katie presents today with concerns for cough for the last 3 weeks. She has been on Zyrtec for allergies and cough persistent. She denies any fevers, shortness of breath, cough is non-productive, no wheezing. She denies any heartburn " symptoms. She denies difficulty swallowing. Once she starts coughing, it is hard to stop.     ROS:   See HPI above.     PMH:   Patient Active Problem List   Diagnosis      delivery delivered     Other specified hypothyroidism     PMDD (premenstrual dysphoric disorder)       Past Medical History:   Diagnosis Date     Disease of thyroid gland        PSH:   Past Surgical History:   Procedure Laterality Date      SECTION       SHOULDER SURGERY           MEDICATIONS:   Current Outpatient Medications on File Prior to Visit   Medication Sig Dispense Refill     BIOTIN ORAL Take by mouth.       cholecalciferol, vitamin D3, (VITAMIN D3 ORAL) Take 5,000 Units by mouth.       FLUoxetine (PROZAC) 20 MG capsule Take 1 capsule (20 mg total) by mouth daily. 90 capsule 2     levothyroxine (SYNTHROID, LEVOTHROID) 137 MCG tablet Take 1 tablet (137 mcg total) by mouth daily. 90 tablet 3     MAGNESIUM ORAL Take by mouth.       No current facility-administered medications on file prior to visit.        ALLERGIES:  Allergies   Allergen Reactions     Venom-Honey Bee Swelling     PN: severe swelling     Codeine Itching     Pine Tar Rash     PN: blisters       SOCIAL HISTORY:   Social History     Tobacco Use     Smoking status: Never Smoker     Smokeless tobacco: Never Used   Substance Use Topics     Alcohol use: No     Drug use: No         PHYSICAL EXAM:   Unable to obtain vitals due to virtual video visit.  Katie is a well appearing female, appropriately groomed, in no acute distress. Sclera white, no nasal discharge, cough is dry, no respiratory distress.     ASSESSMENT & PLAN:   Katie Santoro is a 44 y.o. female presenting today for evaluation of persistent cough.     1. Cough  - fluticasone propionate (FLONASE) 50 mcg/actuation nasal spray; 2 sprays into each nostril daily.  Dispense: 16 g; Refill: 1  - albuterol (PROAIR HFA;PROVENTIL HFA;VENTOLIN HFA) 90 mcg/actuation inhaler; Inhale 2 puffs every 6 (six) hours  as needed for wheezing.  Dispense: 1 each; Refill: 0     Subacute duration of cough. May be post-viral. Discussed differential of silent reflux, postnasal drainage, and reactive airway disease. Will trial empiric coverage for all 3. She has pepcid at home.     If symptoms are not improving in the next 4-6 weeks, return for further evaluation.    RTC: January 2021 - annual physical exam / sooner as needed    Meri Rouse, DO

## 2021-06-10 NOTE — TELEPHONE ENCOUNTER
Spoke to pt, xray appt scheduled for today at 1145. Pt does have a cough and was told to wear a mask.

## 2021-06-10 NOTE — PROGRESS NOTES
ASSESSMENT/PLAN  1. Sore throat  Discussed with patient conservative management for probable viral infection; rest, fluids, ibuprofen, Mucinex.  We will advise her throat culture turns positive.  She should follow-up if worse or not improving.  - Rapid Strep A Screen-Throat  - Group A Strep, RNA Direct Detection, Throat    2. Vitamin D deficiency  Started on high-dose vitamin D by Dr. Aleman in January, recheck level today and will advise both patient and her primary regarding results when available.  - Vitamin D, Total (25-Hydroxy)    Pt states an understanding and agrees to the above plan.          SUBJECTIVE:   Chief Complaint   Patient presents with     Sore Throat     Pt has been exposed to strep in her house. Pt has had a sore throat x3 days. Pt is congested and has a productive cough. Mucus is yellow.      Katie Santoro 41 y.o. female    Current Outpatient Prescriptions   Medication Sig Dispense Refill     ergocalciferol (VITAMIN D2) 50,000 unit capsule Take 50,000 Units by mouth once a week.       levothyroxine (SYNTHROID, LEVOTHROID) 125 MCG tablet Take 1 tablet (125 mcg total) by mouth daily. 90 tablet 3     No current facility-administered medications for this visit.      Allergies: Codeine   No LMP recorded.    HPI:   Katie is a 41-year-old female comes in today for concern regarding a sore throat.  Patient has had upper respiratory congestion and nasal drainage over the last week with an increase in throat complaints over the last couple days.  She assumes this is secondary to postnasal drainage, but has had strep exposure with her 2 children diagnosed with strep in the last month.  Katie reports no history of any fever, chills, nausea, vomiting, or diarrhea.  She has had significant sinus congestion with a minimal cough.  Past history includes hypothyroidism which is up-to-date for labs and patient reports compliance with her medication.  She also has history of vitamin D deficiency diagnosed  earlier this year.  She was started on high-dose vitamin D but has not had a recheck of her level.  She would like to do that today.    ROS: negative except as per HPI    OBJECTIVE:   The patient appears well, alert, oriented x 3, in no distress.  /68 (Patient Site: Right Arm, Patient Position: Sitting, Cuff Size: Adult Regular)  Pulse 60  Temp 98.7  F (37.1  C) (Oral)   Resp 16  Wt 191 lb 14.4 oz (87 kg)  BMI 31.93 kg/m2    HEENT:  Nose/mouth moist, no erythema/lesions. Neck supple. No adenopathy or thyromegaly.   Ears: TM's appear normal bilaterally w/o fluid or erythema  Lungs: clear, good air entry, no wheezes, rhonchi or rales.   Cardiac: S1 and S2 normal, no murmurs, regular rate and rhythm.   Neurological: normal, no focal findings.  Skin: clear, dry, no rashes/lesions    Results for orders placed or performed in visit on 05/19/17   Rapid Strep A Screen-Throat   Result Value Ref Range    Rapid Strep A Antigen No Group A Strep detected, presumptive negative No Group A Strep detected, presumptive negative

## 2021-06-10 NOTE — PROGRESS NOTES
Impression:  Chronic cough with negative chest x-ray, COVID test, and trial of H2 blocker.  Does sound like she has had some bronchospasm in the past with viral infections as well as some allergies.  She may be having bronchospasm related to seasonal allergies    Plan:  Prednisone 20 mg/day for 5 days, albuterol inhaler 2 puffs twice per day, she should go back on her nonsedating H1 blocker through the fall season.  If she does not improve with this she should follow-up with pulmonology for consideration of possible PFTs and further evaluation      Chief Complaint:  Chief Complaint   Patient presents with     Cough     x 2 month, has been seen virtual  on inhaler and nasal spray no improvement         HPI:   Katie Santoro is a 44 y.o. female who presents to this clinic for the evaluation of cough.  Patient has had a cough daily for the past 2 months.  She has some sputum production which is clear in the morning.  She is not a smoker and is not exposed to secondhand smoke.  She notes that in the past, when she would get a cold, she would have a persistent cough for some time afterwards.  She had a COVID test and a chest x-ray that were normal in the past few weeks.  She tried an H2 blocker for a month but it did not help.  She does occasionally hear wheezing.  No nausea vomiting or heartburn.  No chest pain fever or chills.  The cough is moderate.  She does complain of a little bit more shortness of breath with exertion than usual but it does not limit her activities      PMH:   Past Medical History:   Diagnosis Date     Disease of thyroid gland      Past Surgical History:   Procedure Laterality Date      SECTION  2010     SHOULDER SURGERY           ROS:  All other systems negative    Meds:    Current Outpatient Medications:      albuterol (PROAIR HFA;PROVENTIL HFA;VENTOLIN HFA) 90 mcg/actuation inhaler, Inhale 2 puffs every 6 (six) hours as needed for wheezing., Disp: 1 each, Rfl: 0     BIOTIN ORAL, Take  by mouth., Disp: , Rfl:      cholecalciferol, vitamin D3, (VITAMIN D3 ORAL), Take 5,000 Units by mouth., Disp: , Rfl:      FLUoxetine (PROZAC) 20 MG capsule, Take 1 capsule (20 mg total) by mouth daily., Disp: 90 capsule, Rfl: 2     fluticasone propionate (FLONASE) 50 mcg/actuation nasal spray, 2 sprays into each nostril daily., Disp: 16 g, Rfl: 1     levothyroxine (SYNTHROID, LEVOTHROID) 137 MCG tablet, Take 1 tablet (137 mcg total) by mouth daily., Disp: 90 tablet, Rfl: 3     MAGNESIUM ORAL, Take by mouth., Disp: , Rfl:         Social:  Social History     Socioeconomic History     Marital status:      Spouse name: Not on file     Number of children: Not on file     Years of education: Not on file     Highest education level: Not on file   Occupational History     Not on file   Social Needs     Financial resource strain: Not on file     Food insecurity     Worry: Not on file     Inability: Not on file     Transportation needs     Medical: Not on file     Non-medical: Not on file   Tobacco Use     Smoking status: Never Smoker     Smokeless tobacco: Never Used   Substance and Sexual Activity     Alcohol use: No     Drug use: No     Sexual activity: Yes     Partners: Male   Lifestyle     Physical activity     Days per week: Not on file     Minutes per session: Not on file     Stress: Not on file   Relationships     Social connections     Talks on phone: Not on file     Gets together: Not on file     Attends Mosque service: Not on file     Active member of club or organization: Not on file     Attends meetings of clubs or organizations: Not on file     Relationship status: Not on file     Intimate partner violence     Fear of current or ex partner: Not on file     Emotionally abused: Not on file     Physically abused: Not on file     Forced sexual activity: Not on file   Other Topics Concern     Not on file   Social History Narrative     Not on file         Physical Exam:  Temperature is 98.0, /85,  pulse 82, oxygen saturation 96% on room air  Vital signs reviewed  Eyes: PERRL, EOMI  Head: Atraumatic and normocephalic  Lungs: No rales rhonchi or wheezes, expiratory phase is slightly prolonged on forced expiration  CV: Regular without murmur  Neuro: Normal motor and sensory function in all extremities  Psych: Awake, alert, normally responsive      Results:    No results found for this or any previous visit (from the past 24 hour(s)).    No results found.      Marcos Juárez MD

## 2021-06-10 NOTE — TELEPHONE ENCOUNTER
Left message for pt to call back.  Please assist with scheduling pt for an xray only appt.  Also sent pt a Jaegert message regarding getting this scheduled as well.

## 2021-06-12 NOTE — PROGRESS NOTES
"Katie Santoro is a 44 y.o. female who is being evaluated via a billable video visit.      The patient has been notified of following:     \"This video visit will be conducted via a call between you and your physician/provider. We have found that certain health care needs can be provided without the need for an in-person physical exam.  This service lets us provide the care you need with a video conversation.  If a prescription is necessary we can send it directly to your pharmacy.  If lab work is needed we can place an order for that and you can then stop by our lab to have the test done at a later time.    Video visits are billed at different rates depending on your insurance coverage. Please reach out to your insurance provider with any questions.    If during the course of the call the physician/provider feels a video visit is not appropriate, you will not be charged for this service.\"    Patient has given verbal consent to a Video visit? Yes  How would you like to obtain your AVS? AVS Preference: MyChart.  If dropped by the video visit, the video invitation should be sent to: Text to cell phone: [phone]  Will anyone else be joining your video visit? No        Video Start Time: 3:00    Additional provider notes:   Assessment/ Plan     1. Yazmin Wilson has had some intermittent episodes of prolonged cough and wheezing over the last 6 months.  With the last episode, she responded well to oral prednisone.  She is having some relief with a Ventolin inhaler.  I suspect she has developed a level of asthma.  Would recommend a trial of an inhaled steroid over the next 1 month.  She has had a chest x-ray and a negative Covid test.  Since we cannot do spirometry right now, could possibly consider pulmonary function testing, but as she is fairly stable, I think this can be put on hold.  - beclomethasone (QVAR) 80 mcg/actuation inhaler; Inhale 1 puff 2 (two) times a day.  Dispense: 1 Inhaler; Refill: " 12      Subjective:       Katie Santoro is a 44 y.o. female who presents for chronic cough.  She states that she feels like her cough started with back in the spring when she had a viral process.  It lasted quite a long time and then eventually went away.  She feels like she has had 3 separate bouts in the last 6 months which lasted quite a long time.  She has had several visits and was treated appropriately for chronic cough for possible reflux, postnasal drip, and asthma.  On one visit to the walk-in, she was given a course of prednisone which was very helpful.  She had been cough free for about a month since that visit until about a month ago the symptoms started again.  She states is not as bad as it had been.  She is using her albuterol inhaler prior to exercise and that is been helpful.  She also take it one other time typically at night.  Her cough seems worse if she is laughing.  It seems worse in the morning and at night.  If not keeping her up at night.  She is not had fevers or felt sick.  She did come in and have a chest x-ray which was normal.  She had a Covid test which was negative.  She previously has no diagnosis of asthma in the past.    Relevant past medical, family, surgical, and social history reviewed with patient, unless noted in HPI, not pertinent for this visit.  Medications were discussed and reconciled.   Review of Systems   A 12 point comprehensive review of systems was negative except as noted.      Current Outpatient Medications   Medication Sig Dispense Refill     albuterol (PROAIR HFA;PROVENTIL HFA;VENTOLIN HFA) 90 mcg/actuation inhaler Inhale 2 puffs every 6 (six) hours as needed for wheezing. 1 each 0     BIOTIN ORAL Take by mouth.       cholecalciferol, vitamin D3, (VITAMIN D3 ORAL) Take 5,000 Units by mouth.       FLUoxetine (PROZAC) 20 MG capsule Take 1 capsule (20 mg total) by mouth daily. 90 capsule 2     fluticasone propionate (FLONASE) 50 mcg/actuation nasal spray 2 sprays  into each nostril daily. 16 g 1     levothyroxine (SYNTHROID, LEVOTHROID) 137 MCG tablet Take 1 tablet (137 mcg total) by mouth daily. 90 tablet 3     MAGNESIUM ORAL Take by mouth.       beclomethasone (QVAR) 80 mcg/actuation inhaler Inhale 1 puff 2 (two) times a day. 1 Inhaler 12     No current facility-administered medications for this visit.        Objective:      There were no vitals taken for this visit.      General appearance: alert, appears stated age and cooperative     No observed coughing or labored breathing    No results found for this or any previous visit (from the past 168 hour(s)).       This note has been dictated using voice recognition software. Any grammatical or context distortions are unintentional and inherent to the software    Video-Visit Details    Type of service:  Video Visit    Video End Time (time video stopped): 3:12  Originating Location (pt. Location): Home    Distant Location (provider location):  [facility]     Platform used for Video Visit: Moose Aleman MD

## 2021-06-13 NOTE — TELEPHONE ENCOUNTER
Refill Approved    Rx renewed per Medication Renewal Policy. Medication was last renewed on 1/17/20.    Kelsey Shetty, [facility] Connection Triage/Med Refill 11/20/2020     Requested Prescriptions   Pending Prescriptions Disp Refills     FLUoxetine (PROZAC) 20 MG capsule [Pharmacy Med Name: FLUOXETINE HCL 20MG CAPS] 90 capsule 2     Sig: TAKE ONE CAPSULE BY MOUTH ONCE DAILY       SSRI Refill Protocol  Passed - 11/19/2020 10:12 AM        Passed - PCP or prescribing provider visit in last year     Last office visit with prescriber/PCP: 12/6/2019 Yovana Aleman MD OR same dept: 12/6/2019 Yovana Aleman MD OR same specialty: 12/6/2019 Yovana Aleman MD  Last physical: 1/17/2020 Last MTM visit: Visit date not found   Next visit within 3 mo: Visit date not found  Next physical within 3 mo: Visit date not found  Prescriber OR PCP: Yovana Aleman MD  Last diagnosis associated with med order: 1. PMDD (premenstrual dysphoric disorder)  - FLUoxetine (PROZAC) 20 MG capsule [Pharmacy Med Name: FLUOXETINE HCL 20MG CAPS]; TAKE ONE CAPSULE BY MOUTH ONCE DAILY  Dispense: 90 capsule; Refill: 2    If protocol passes may refill for 12 months if within 3 months of last provider visit (or a total of 15 months).

## 2021-06-13 NOTE — TELEPHONE ENCOUNTER
Refill Approved    Rx renewed per Medication Renewal Policy. Medication was last renewed on 7/21/20.    eKlsey Shetty, [facility] Connection Triage/Med Refill 11/20/2020     Requested Prescriptions   Pending Prescriptions Disp Refills     fluticasone propionate (FLONASE) 50 mcg/actuation nasal spray [Pharmacy Med Name: FLUTICASONE NASAL SPRAY] 16 g 1     Sig: SPRAY 2 SPRAYS INTO EACH NOSTRIL ONCE DAILY       Nasal Steroid Refill Protocol Passed - 11/19/2020 10:12 AM        Passed - Patient has had office visit/physical in last 2 years     Last office visit with prescriber/PCP: Visit date not found OR same dept: Visit date not found OR same specialty: 12/6/2019 Yovana Aleman MD Last physical: Visit date not found Last MTM visit: Visit date not found    Next appt within 3 mo: Visit date not found  Next physical within 3 mo: Visit date not found  Prescriber OR PCP: Meri Rouse DO  Last diagnosis associated with med order: 1. Cough  - fluticasone propionate (FLONASE) 50 mcg/actuation nasal spray [Pharmacy Med Name: FLUTICASONE NASAL SPRAY]; SPRAY 2 SPRAYS INTO EACH NOSTRIL ONCE DAILY  Dispense: 16 g; Refill: 1     If protocol passes may refill for 12 months if within 3 months of last provider visit (or a total of 15 months).

## 2021-06-13 NOTE — PROGRESS NOTES
Assessment/ Plan     1. Perioral dermatitis  Patient has perioral dermatitis that is quite bothersome to her at this point.  She prefers to start with an oral antibiotic.  We will have her do doxycycline twice a day until clearing, then go down to once daily for an additional week.  I give her extra tablets to have on hand in case it recurs this winter.  I also gave her topical metronidazole lotion to use as well.  She can use this when she first starts to see an outbreak to see if that will help first before taking the oral antibiotic.  Reviewed potential side effects including GI upset.  - doxycycline (MONODOX) 100 MG capsule; Take 1 capsule (100 mg total) by mouth 2 (two) times a day.  Dispense: 60 capsule; Refill: 1  - metroNIDAZOLE (METROLOTION) 0.75 % Lotn; Apply 1 application topically 2 (two) times a day for 10 days.  Dispense: 1 Bottle; Refill: 0    2. Other specified hypothyroidism  Patient has hypothyroidism and had a normal TSH back in January.  It was in the upper range of normal.  She is having quite a bit of fatigue and difficulty losing weight.  We will recheck it today.  We discussed the benefits with end range if it is at the higher end, could consider bumping up her Synthroid as she is very symptomatic.  - Thyroid Stimulating Hormone (TSH)      Subjective:       Katie Santoro is a 41 y.o. female who presents for evaluation of a rash.  Patient states she has a history of getting this rash.  This started back in college.  She tends to get it at the change of seasons.  If she catches it soon enough, usually a topical antibiotic will help.  She has been on oral antibiotics in the past as well.  Her daughter tends to get a rash as well and she used some of her cream.  It sounds like maybe that was an antifungal cream.  She has not been using any steroids on it.  She also complains of fatigue and difficulty with weight loss.  She joined [employer] and has been doing that 6 days a week.  She  "has also been following the diet plan and really is not seeing any results.  We last checked her TSH in January and it was 4.8.  We will recheck it today and we certainly can bump up the dose if she still running in the high normal range.    Relevant past medical, family, surgical, and social history reviewed with patient, unless noted in HPI, not pertinent for this visit.    Review of Systems   A 12 point comprehensive review of systems was negative except as noted.      Current Outpatient Prescriptions   Medication Sig Dispense Refill     ergocalciferol (VITAMIN D2) 50,000 unit capsule Take 50,000 Units by mouth once a week.       levothyroxine (SYNTHROID, LEVOTHROID) 125 MCG tablet Take 1 tablet (125 mcg total) by mouth daily. 90 tablet 3     magnesium citrate solution Take 296 mL by mouth once.       OMEGA-3/DHA/EPA/FISH OIL (FISH OIL-OMEGA-3 FATTY ACIDS) 300-1,000 mg capsule Take 2 g by mouth daily.       doxycycline (MONODOX) 100 MG capsule Take 1 capsule (100 mg total) by mouth 2 (two) times a day. 60 capsule 1     metroNIDAZOLE (METROLOTION) 0.75 % Lotn Apply 1 application topically 2 (two) times a day for 10 days. 1 Bottle 0     No current facility-administered medications for this visit.        Objective:      /68  Pulse 68  Temp 98.2  F (36.8  C) (Oral)   Resp 16  Ht 5' 5\" (1.651 m)  Wt 191 lb (86.6 kg)  BMI 31.78 kg/m2      General appearance: alert, appears stated age and cooperative  Lungs: clear to auscultation bilaterally  Heart: regular rate and rhythm, S1, S2 normal, no murmur, click, rub or gallop  Skin: Small skin colored papules around the mouth, chin, and under the nose.          No results found for this or any previous visit (from the past 168 hour(s)).       This note has been dictated using voice recognition software. Any grammatical or context distortions are unintentional and inherent to the software  "

## 2021-06-14 NOTE — PROGRESS NOTES
Assessment/ Plan     1. Routine general medical examination at a health care facility  Ktaie presents for her annual exam.  Its been 4 years since her last Pap smear, so we decided update today.  She had her mammogram this year.  She is up-to-date on immunizations.  We will do fasting blood work today.  - Gynecologic Cytology (PAP Smear)  - Comprehensive Metabolic Panel  - Glycosylated Hemoglobin A1c  - HM2(CBC w/o Differential)  - Lipid Cascade  - Thyroid Stimulating Hormone (TSH)    2. Other specified hypothyroidism  Her thyroid has been fairly stable although she is feeling quite fatigued.  We will check her TSH today.  - levothyroxine (SYNTHROID, LEVOTHROID) 137 MCG tablet; Take 1 tablet (137 mcg total) by mouth daily.  Dispense: 90 tablet; Refill: 3  - Thyroid Stimulating Hormone (TSH)    3. PMDD (premenstrual dysphoric disorder)  Her PMDD is under good control with her current dose of fluoxetine.  JORGE LUIS-7 equals 0    4. Eczematous dermatitis of upper eyelids of both eyes  She has been struggling with eyelid dermatitis for several months.  She has been to the dermatologist on 3 occasions without improvement.  Would recommend a second opinion with a different dermatologist with possible allergy referral for patch testing if they think this is necessary.  - Ambulatory referral to Dermatology    5. Class 1 obesity with serious comorbidity and body mass index (BMI) of 34.0 to 34.9 in adult, unspecified obesity type  She is trying to stay active and exercise on a regular basis.  We will check an A1c today.    6. Post-viral cough syndrome  For the last several years she has had a prolonged cough after viral process.  This responded well to an inhaled steroid.  I recommend that she keep on hand and started as soon as she has the first symptoms of a cold.      Subjective:     Katie Santoro is a 45 y.o. female who presents for an annual exam.  Overall, she states she has been doing fairly well.  She has been struggling  with some eyelid dermatitis.  She has been to the same dermatologist on 3 occasions and is just not improving.  She states she again had a prolonged cough after viral process and it responded to an inhaled steroid.  She does not have a formal diagnosis of asthma and is a non-smoker.  She is hypothyroidism which has been fairly stable.  She has felt more fatigued lately.  She takes Prozac daily for PMDD symptoms and things are under good control.  As far as healthcare maintenance, her last Pap smear was 2017.  She had mammogram this year.  She is up-to-date on flu shot.    Healthy Habits:   Regular Exercise: Yes  Sunscreen Use: Yes  Healthy Diet: Yes  Dental Visits Regularly: Yes  Seat Belt: Yes  Sexually active: Yes  Self Breast Exam Monthly:Yes  Lipid Profile: Yes  Glucose Screen: Yes  Prevention of Osteoporosis: Yes  Last Dexa: N/A        Immunization History   Administered Date(s) Administered     INFLUENZA,SEASONAL QUAD, PF, =/> 6months 2014, 09/15/2016, 2020     Influenza, inj, historic,unspecified 10/01/2019     Influenza,seasonal,quad inj =/> 6months 2015     Td,adult,historic,unspecified 1973     Tdap 2015     Immunization status: up to date and documented.     Gynecologic History  Patient's last menstrual period was 2021 (exact date).  Contraception: vasectomy  Last Pap: . Results were: normal  Last mammogram: . Results were: normal      OB History    Para Term  AB Living   3 1   1 1 2   SAB TAB Ectopic Multiple Live Births   1       2      # Outcome Date GA Lbr Hugh/2nd Weight Sex Delivery Anes PTL Lv   3  06/15/15 36w5d  6 lb 7.7 oz (2.94 kg) F CS-LTranv   JAQCUE   2 SAB            1          JACQUE       Current Outpatient Medications   Medication Sig Dispense Refill     albuterol (PROAIR HFA;PROVENTIL HFA;VENTOLIN HFA) 90 mcg/actuation inhaler Inhale 2 puffs every 6 (six) hours as needed for wheezing. 1 each 0     BIOTIN ORAL Take by  mouth.       cholecalciferol, vitamin D3, (VITAMIN D3 ORAL) Take 5,000 Units by mouth.       FLUoxetine (PROZAC) 20 MG capsule TAKE ONE CAPSULE BY MOUTH ONCE DAILY 90 capsule 3     fluticasone propionate (FLONASE) 50 mcg/actuation nasal spray SPRAY 2 SPRAYS INTO EACH NOSTRIL ONCE DAILY 16 g 10     hydrocortisone 2.5 % ointment APPLY SMALL AMOUNT EXTERNALLY TO THE AFFECTED AREA TWICE DAILY AS DIRECTED. DO NOT APPLY LONGER THAN ACUTE OPIOID THERAPY TO 14 DAYS       levothyroxine (SYNTHROID, LEVOTHROID) 137 MCG tablet Take 1 tablet (137 mcg total) by mouth daily. 90 tablet 3     MAGNESIUM ORAL Take by mouth.       fluticasone propionate (FLOVENT HFA) 220 mcg/actuation inhaler Inhale 1 puff 2 (two) times a day. 1 Inhaler 11     No current facility-administered medications for this visit.      Past Medical History:   Diagnosis Date     Disease of thyroid gland      Past Surgical History:   Procedure Laterality Date      SECTION       SHOULDER SURGERY       Venom-honey bee, Codeine, and Pine tar  Family History   Problem Relation Age of Onset     Hypothyroidism Mother      Hypertension Mother      Heart disease Father      Hypertension Brother      Hyperlipidemia Brother      Hypothyroidism Maternal Aunt      Hypertension Maternal Aunt      Hypothyroidism Maternal Grandmother      Heart disease Paternal Grandfather      Social History     Socioeconomic History     Marital status:      Spouse name: Not on file     Number of children: Not on file     Years of education: Not on file     Highest education level: Not on file   Occupational History     Not on file   Social Needs     Financial resource strain: Not on file     Food insecurity     Worry: Not on file     Inability: Not on file     Transportation needs     Medical: Not on file     Non-medical: Not on file   Tobacco Use     Smoking status: Never Smoker     Smokeless tobacco: Never Used   Substance and Sexual Activity     Alcohol use: No     Drug  "use: No     Sexual activity: Yes     Partners: Male   Lifestyle     Physical activity     Days per week: Not on file     Minutes per session: Not on file     Stress: Not on file   Relationships     Social connections     Talks on phone: Not on file     Gets together: Not on file     Attends Rastafari service: Not on file     Active member of club or organization: Not on file     Attends meetings of clubs or organizations: Not on file     Relationship status: Not on file     Intimate partner violence     Fear of current or ex partner: Not on file     Emotionally abused: Not on file     Physically abused: Not on file     Forced sexual activity: Not on file   Other Topics Concern     Not on file   Social History Narrative     Not on file       Review of Systems  General:  Denies problems  Eyes:  Denies problems  Ears/Nose/Throat:  Denies problems  Cardiovascular:  Denies problems  Respiratory:  Denies problems  Gastrointestinal:  Denies problems  Genitourinary:  Denies problems  Musculoskeletal:  Denies problems  Skin:  Denies problems  Neurologic:  Denies problems  Psychiatric:  Denies problems  Endocrine:  Denies problems  Heme/Lymphatic:  Denies problems  Allergic/Immunologic:  Denies problems    Objective:      Vitals:    01/19/21 0925   BP: 125/71   Patient Site: Left Arm   Patient Position: Sitting   Cuff Size: Adult Large   Pulse: 71   Resp: 16   Weight: 203 lb (92.1 kg)   Height: 5' 4.76\" (1.645 m)       Physical Exam:  General Appearance: Alert, cooperative, no distress, appears stated age  Head: Normocephalic, without obvious abnormality, atraumatic  Eyes: PERRL, conjunctiva/corneas clear, EOM's intact  Ears: Normal TM's and external ear canals, both ears  Nose: Nares normal, septum midline,mucosa normal, no drainage  Throat: Lips, mucosa, and tongue normal; teeth and gums normal  Neck: Supple, symmetrical, trachea midline, no adenopathy; thyroid: not enlarged, symmetric, no tenderness/mass/nodules; no " carotid bruit  Back: Symmetric, no curvature, ROM normal, no CVA tenderness  Lungs: Clear to auscultation bilaterally, respirations unlabored  Breasts: No breast masses, tenderness, asymmetry, or nipple discharge  Heart: Regular rate and rhythm, S1 and S2 normal, no murmur, rub, or gallop, Abdomen: Soft, non-tender, bowel sounds active all four quadrants,  no masses, no organomegaly  Pelvic: Normally developed genitalia with no external lesions or eruptions. Vagina and cervix show no lesions, inflammation, discharge or tenderness. Uterus normal to palpation.  No adnexal mass or tenderness.  Extremities: Extremities normal, atraumatic, no cyanosis or edema  Skin: Skin color, texture, turgor normal, no rashes or lesions.  Upper erythematous slightly thickened skin.  Lymph nodes: Cervical, supraclavicular, and axillary nodes normal  Neurologic: Normal        Results for orders placed or performed in visit on 08/09/20   COVID-19 Virus PCR MRF    Specimen: Respiratory   Result Value Ref Range    COVID-19 VIRUS SPECIMEN SOURCE Nasopharyngeal     2019-nCOV Not Detected        Yovana Aleman MD    This note has been dictated using voice recognition software. Any grammatical or context distortions are unintentional and inherent to the software

## 2021-06-16 NOTE — PROGRESS NOTES
Provider E-Visit time total (minutes): 6    Assessment:   The encounter diagnosis was Suspected COVID-19 virus infection.     Plan:   No medications were ordered this encounter    Patient Instructions     Dear Katie Santoro,    Your symptoms show that you may have coronavirus (COVID-19). This illness can cause fever, cough and trouble breathing. Many people get a mild case and get better on their own. Some people can get very sick.    Will I be tested for COVID-19?  We would like to test you for Covid-19 virus. I have placed orders for this test.     To schedule: go to your [employer] home page and scroll down to the section that says  You have an appointment that needs to be scheduled  and click the large green button that says  Schedule Now  and follow the steps to find the next available openings.    If you are unable to complete these [employer] scheduling steps, please call [phone] to schedule your testing.     Return to work/school/ guidance:  Please let your workplace manager and staffing office know when your quarantine ends     We can t give you an exact date as it depends on the above. You can calculate this on your own or work with your manager/staffing office to calculate this. (For example if you were exposed on 10/4, you would have to quarantine for 14 full days. That would be through 10/18. You could return on 10/19.)      If you receive a positive COVID-19 test result, follow the guidance of the those who are giving you the results. Usually the return to work is 10 (or in some cases 20 days from symptom onset.) If you work at [facility], you must also be cleared by Employee Occupational Health and Safety to return to work.        If you receive a negative COVID-19 test result and did not have a high risk exposure to someone with a known positive COVID-19 test, you can return to work once you're free of fever for 24 hours without fever-reducing medication and your symptoms are improving  or resolved.      If you receive a negative COVID-19 test and If you had a high risk exposure to someone who has tested positive for COVID-19 then you can return to work 14 days after your last contact with the positive individual    Note: If you have ongoing exposure to the covid positive person, this quarantine period may be more than 14 days. (For example, if you are continued to be exposed to your child who tested positive and cannot isolate from them, then the quarantine of 7-14 days can't start until your child is no longer contagious. This is typically 10 days from onset of the child's symptoms. So the total duration may be 17-24 days in this case.)    Sign up for [employer].   We know it's scary to hear that you might have COVID-19. We want to track your symptoms to make sure you're okay over the next 2 weeks. Please look for an email from [employer]--this is a free, online program that we'll use to keep in touch. To sign up, follow the link in the email you will receive. Learn more at [email or web address]    How can I take care of myself?    Get lots of rest. Drink extra fluids (unless a doctor has told you not to)    Take Tylenol (acetaminophen) or ibuprofen for fever or pain. If you have liver or kidney problems, ask your family doctor if it's okay to take Tylenol o ibuprofen    If you have other health problems (like cancer, heart failure, an organ transplant or severe kidney disease): Call your specialty clinic if you don't feel better in the next 2 days.    Know when to call 911. Emergency warning signs include:  o Trouble breathing or shortness of breath  o Pain or pressure in the chest that doesn't go away  o Feeling confused like you haven't felt before, or not being able to wake up  o Bluish-colored lips or face    Where can I get more information?   [facility] - About COVID-19:   [email or web address]    CDC - What to Do If You're Sick:    www.cdc.gov/coronavirus/2019-ncov/about/steps-when-sick.html          No follow-ups on file.    Subjective:   Katie Santoro is a 45 y.o. female who submitted an eVisit request for evaluation of her Covid Concern (Entered automatically based on patient selection in [employer].) and Covid Concern.  See the questionnaire and message section of encounter report for information related to history of present illness and review of systems.    The following portions of the patient's history were reviewed and updated as appropriate:  She does not have any pertinent problems on file.  She is allergic to venom-honey bee; codeine; and pine tar..     Objective:   No exam performed today, patient submitted as eVisit.

## 2021-06-16 NOTE — PATIENT INSTRUCTIONS - HE
Dear Kaite Santoro,    Your symptoms show that you may have coronavirus (COVID-19). This illness can cause fever, cough and trouble breathing. Many people get a mild case and get better on their own. Some people can get very sick.    Will I be tested for COVID-19?  We would like to test you for Covid-19 virus. I have placed orders for this test.     To schedule: go to your [employer] home page and scroll down to the section that says  You have an appointment that needs to be scheduled  and click the large green button that says  Schedule Now  and follow the steps to find the next available openings.    If you are unable to complete these [employer] scheduling steps, please call [phone] to schedule your testing.     Return to work/school/ guidance:  Please let your workplace manager and staffing office know when your quarantine ends     We can t give you an exact date as it depends on the above. You can calculate this on your own or work with your manager/staffing office to calculate this. (For example if you were exposed on 10/4, you would have to quarantine for 14 full days. That would be through 10/18. You could return on 10/19.)      If you receive a positive COVID-19 test result, follow the guidance of the those who are giving you the results. Usually the return to work is 10 (or in some cases 20 days from symptom onset.) If you work at [facility], you must also be cleared by Employee Occupational Health and Safety to return to work.        If you receive a negative COVID-19 test result and did not have a high risk exposure to someone with a known positive COVID-19 test, you can return to work once you're free of fever for 24 hours without fever-reducing medication and your symptoms are improving or resolved.      If you receive a negative COVID-19 test and If you had a high risk exposure to someone who has tested positive for COVID-19 then you can return to work 14 days after your last contact  with the positive individual    Note: If you have ongoing exposure to the covid positive person, this quarantine period may be more than 14 days. (For example, if you are continued to be exposed to your child who tested positive and cannot isolate from them, then the quarantine of 7-14 days can't start until your child is no longer contagious. This is typically 10 days from onset of the child's symptoms. So the total duration may be 17-24 days in this case.)    Sign up for [employer].   We know it's scary to hear that you might have COVID-19. We want to track your symptoms to make sure you're okay over the next 2 weeks. Please look for an email from [employer]--this is a free, online program that we'll use to keep in touch. To sign up, follow the link in the email you will receive. Learn more at [email or web address]    How can I take care of myself?    Get lots of rest. Drink extra fluids (unless a doctor has told you not to)    Take Tylenol (acetaminophen) or ibuprofen for fever or pain. If you have liver or kidney problems, ask your family doctor if it's okay to take Tylenol o ibuprofen    If you have other health problems (like cancer, heart failure, an organ transplant or severe kidney disease): Call your specialty clinic if you don't feel better in the next 2 days.    Know when to call 911. Emergency warning signs include:  o Trouble breathing or shortness of breath  o Pain or pressure in the chest that doesn't go away  o Feeling confused like you haven't felt before, or not being able to wake up  o Bluish-colored lips or face    Where can I get more information?  [facility] - About COVID-19:   [email or web address]    CDC - What to Do If You're Sick:   www.cdc.gov/coronavirus/2019-ncov/about/steps-when-sick.html

## 2021-06-18 NOTE — PATIENT INSTRUCTIONS - HE
Patient Instructions by Marcos Juárez MD at 8/21/2020  7:10 AM     Author: Marcos Juárez MD Service: -- Author Type: Physician    Filed: 8/21/2020  7:38 AM Encounter Date: 8/21/2020 Status: Signed    : Marcos Juárez MD (Physician)         Patient Education     Bronchospasm (Adult)    Bronchospasm occurs when the airways (bronchial tubes) go into spasm and contract. This makes it hard to breathe and causes wheezing (a high-pitched whistling sound). Bronchospasm can also cause frequent coughing without wheezing.  Bronchospasm is due to irritation, inflammation, or allergic reaction of the airways. People with asthma get bronchospasm. However, not everyone with bronchospasm has asthma.  Being exposed to harmful fumes, a recent case of bronchitis, exercise, or a flare-up of chronic obstructive pulmonary disease (COPD) may cause the airways to spasm. An episode of bronchospasm may last 7 to 14 days. Medicine may be prescribed to relax the airways and prevent wheezing. Antibiotics will be prescribed only if your healthcare provider thinks there is a bacterial infection. Antibiotics do not help a viral infection.  Home care    Drink lots of water or other fluids (at least 10 glasses a day) during an attack. This will loosen lung secretions and make it easier to breathe. If you have heart or kidney disease, check with your doctor before you drink extra fluids.    Take prescribed medicine exactly at the times advised. If you take an inhaled medicine to help with breathing, do not use it more than once every 4 hours, unless told to do so. If prescribed an antibiotic or prednisone, take all of the medicine, even if you are feeling better after a few days.    Do not smoke. Also avoid being exposed to secondhand smoke.    If you were given an inhaler, use it exactly as directed. If you need to use it more often than prescribed, your condition may be getting worse. Contact your healthcare provider.  Follow-up  care  Follow up with your healthcare provider, or as advised.  If you are age 65 or older, have a chronic lung disease or condition that affects your immune system, or you smoke, ask your healthcare provider about getting a pneumococcal vaccine, as well as a yearly flu shot (influenza vaccine).  When to seek medical advice  Call your healthcare provider right away if any of these occur:    You need to use your inhalers more often than usual    Fever of 100.4 F (38 C) or higher, or as directed by your healthcare provider    Cough that brings up lots of dark-colored sputum (mucus)    You don't get better within 24 hours  Call 911  Call 911 if any of these occur:    Coughing up bloody sputum (mucus)    Chest pain with each breath    Increased wheezing or shortness of breath  Date Last Reviewed: 9/13/2015 2000-2017 [employer]. [address] [place] [address]. All rights reserved. This information is not intended as a substitute for professional medical care. Always follow your healthcare professional's instructions.           Patient Education     Chronic Cough with Uncertain Cause (Adult)    Everyone has had a cough as part of the common cold, flu, or bronchitis. This kind of cough occurs along with an achy feeling, low-grade fever, nasal and sinus congestion, and a scratchy or sore throat. This usually gets better in 2 to 3 weeks. A cough that lasts longer than 3 weeks may be due to other causes.  If your cough does not improve over the next 2 weeks, further testing may be needed. Follow up with your healthcare provider as advised. Cough suppressants may be recommended. Based on your exam today, the exact cause of your cough is not certain. Below are some common causes for persistent cough.  Smoker's cough  Smokers cough doesnt go away. If you continue to smoke, it only gets worse. The cough is from irritation in the air passages. Talk to your healthcare provider about quitting. Medicines  or nicotine-replacement products, like gum or the patch, may make quitting easier.  Postnasal drip  A cough that is worse at night may be due to postnasal drip. Excess mucus in the nose drains from the back of your nose to your throat. This triggers the cough reflex. Postnasal drip may be due to a sinus infection or allergy. Common allergens include dust, tobacco smoke (both inhaled and secondhand smoke), environmental pollutants, pollen, mold, pets, cleaning agents, room deodorizers, and chemical fumes. Over-the-counter antihistamines or decongestants may be helpful for allergies. A sinus infection may requires antibiotic treatment. See your healthcare provider if symptoms continue.  Medicines  Certain prescribed medicines can cause a chronic cough in some people:    ACE inhibitors for high blood pressure. These include benazepril, captopril, enalapril, fosinopril, lisinopril, quinapril, ramipril, and others.    Beta-blockers for high blood pressure and other conditions. These include propranolol, atenolol, metoprolol, nadolol, and others.  Let your healthcare provider know if you are taking any of these.  Asthma  Cough may be the only sign of mild asthma. You may have tests to find out if asthma is causing your cough. You may also take asthma medicine on a trial basis.  Acid reflux (heartburn, GERD)  The esophagus is the tube that carries food from the mouth to the stomach. A valve at its lower end prevents stomach acids from flowing upward. If this valve does not work properly, acid from the stomach enters the esophagus. This may cause a burning pain in the upper abdomen or lower chest, belching, or cough. Symptoms are often worse when lying flat. Avoid eating or drinking before bedtime. Try using extra pillows to raise your upper body, or place 4-inch blocks under the head of your bed. You may try an over-the-counter antacid or an acid-blocking medicine such as famotidine, cimetidine, ranitidine, esomeprazole,  lansoprazole, or omeprazole. Stronger medicines for this condition can be prescribed by your healthcare provider.  Follow-up care  Follow up with your healthcare provider, or as advised, if your cough does not improve. Further testing may be needed.  Note: If an X-ray was taken, a specialist will review it. You will be notified of any new findings that may affect your care.  When to seek medical advice  Call your healthcare provider right away if any of these occur:    Mild wheezing or difficulty breathing    Fever of 100.4 F (38 C) or higher, or as directed by your healthcare provider    Unexpected weight loss    Coughing up large amounts of colored sputum    Night sweats (sheets and pajamas get soaking wet)  Call 911  Call 911 if any of these occur:    Coughing up blood    Moderate to severe trouble breathing or wheezing  Date Last Reviewed: 9/13/2015 2000-2017 [employer]. [address] [place] [address], [place] [address]. All rights reserved. This information is not intended as a substitute for professional medical care. Always follow your healthcare professional's instructions.

## 2021-06-18 NOTE — PROGRESS NOTES
Assessment/ Plan     1. Routine general medical examination at a health care facility  As far as healthcare maintenance, she had a Pap smear in 2017 that was normal.  She prefers to wait age 43 for first mammogram.  She would like to check her cholesterol due to her strong family history of high cholesterol, heart disease, and stroke.  Congratulated her on her efforts to increase her activity as she is exercising regularly now.  - Comprehensive Metabolic Panel  - HM2(CBC w/o Differential)  - Lipid Cascade  - Thyroid Stimulating Hormone (TSH)    2. Other specified hypothyroidism  Patient has seen endocrinology since I last saw her and they have made some adjustments with her thyroid meds.  Will recheck her levels today.  - Thyroid Stimulating Hormone (TSH)    3. Arthralgia  Patient is having ongoing issues with achy joints.  This is mainly in her hands, back, and ankles.  She does have a family history in her mother and grandmother but she is not sure what type of arthritis, she thought possibly rheumatoid arthritis.  We will do some blood work and that if this is normal, we discussed typical treatment of osteoarthritis.  - Lyme Antibody Cascade  - Erythrocyte Sedimentation Rate  - C-Reactive Protein  - Rheumatoid Factor Quant  - Antinuclear Antibody (MAVIS) Cascade  - CCP Antibodies  - Uric Acid  4.  Eyelid swelling  Would recommend that she take a Zyrtec in the morning and Benadryl at night.  Would apply cortisone cream twice daily.  If no improvement over the next 1 month, could refer to allergy for patch testing.  Subjective:     Katie Santoro is a 42 y.o. female who presents for an annual exam.  Overall, she is doing fairly well.  She started doing kickboxing 6 days a week and this is good for stress relief.  She has noticed that since she has been doing this she feels more achy.  She does state that she has noticed this for the last 6-7 years.  When she wakes up in the morning she just feels really stiff for  about the first half hour.  Her lower back will be stiff in her hands will hurt.  She states it is hard for her to open jars or doing any gripping or twisting.  She does not necessarily notice any redness or swelling.  She has been dealing with some Achilles tendinitis for which she is doing physical therapy.  She states her grandmother and her mother have some type of arthritis but she is not sure what kind.  She thought maybe her grandmother had rheumatoid arthritis, but she is not entirely sure of that.  We did have a discussion about rheumatoid arthritis versus osteoarthritis and the differences between the 2 including treatment and natural course.  She also has had some swelling of her upper eyelids over the last several weeks.  States it was really bad this morning that she had ice her eyes as they were almost swollen shut.  She can think of anything new or different that she has been doing.  She typically does not have seasonal allergies.  As far as healthcare maintenance, she had a normal Pap smear last year.  She would like to wait age 43 for first mammogram.  She would like to do fasting blood work as she has a strong family history of high cholesterol and heart disease.  She has seen an endocrinologist since I last saw her and they did some adjustments with her medications.    Healthy Habits:   Regular Exercise: Yes  Sunscreen Use: Yes  Healthy Diet: Yes  Dental Visits Regularly: Yes  Seat Belt: Yes  Sexually active: Yes  Self Breast Exam Monthly:Yes  Colonoscopy: No  Lipid Profile: Yes  Glucose Screen: Yes  Prevention of Osteoporosis: Yes  Last Dexa: No        Immunization History   Administered Date(s) Administered     Influenza, seasonal,quad inj 36+ mos 11/17/2015     Td,adult,historic,unspecified 1973     Tdap 06/03/2015     Immunization status: up to date and documented.     Gynecologic History  Patient's last menstrual period was 05/10/2018.  Contraception: none  Last Pap: 2017. Results were:  normal        OB History    Para Term  AB Living   3 1  1 1 2   SAB TAB Ectopic Multiple Live Births   1    2      # Outcome Date GA Lbr Hugh/2nd Weight Sex Delivery Anes PTL Lv   3  06/15/15 36w5d  6 lb 7.7 oz (2.94 kg) F CS-LTranv   JACQUE   2 SAB            1          JACQUE          Current Outpatient Prescriptions   Medication Sig Dispense Refill     ergocalciferol (VITAMIN D2) 50,000 unit capsule Take 50,000 Units by mouth once a week.       levothyroxine (SYNTHROID, LEVOTHROID) 137 MCG tablet Take 137 mcg by mouth.       magnesium citrate solution Take 296 mL by mouth once.       OMEGA-3/DHA/EPA/FISH OIL (FISH OIL-OMEGA-3 FATTY ACIDS) 300-1,000 mg capsule Take 2 g by mouth daily.       No current facility-administered medications for this visit.      Past Medical History:   Diagnosis Date     Disease of thyroid gland      Past Surgical History:   Procedure Laterality Date      SECTION       SHOULDER SURGERY       Venom-honey bee; Codeine; and Pine tar  Family History   Problem Relation Age of Onset     Hypothyroidism Mother      Hypertension Mother      Heart disease Father      Hypertension Brother      Hyperlipidemia Brother      Hypothyroidism Maternal Aunt      Hypertension Maternal Aunt      Hypothyroidism Maternal Grandmother      Heart disease Paternal Grandfather      Social History     Social History     Marital status:      Spouse name: N/A     Number of children: N/A     Years of education: N/A     Occupational History     Not on file.     Social History Main Topics     Smoking status: Never Smoker     Smokeless tobacco: Never Used     Alcohol use No     Drug use: No     Sexual activity: Yes     Partners: Male     Other Topics Concern     Not on file     Social History Narrative       Review of Systems  General:  Denies problems  Eyes:  Denies problems  Ears/Nose/Throat:  Denies problems  Cardiovascular:  Denies problems  Respiratory:  Denies  "problems  Gastrointestinal:  Denies problems  Genitourinary:  Denies problems  Musculoskeletal:  Denies problems  Skin:  Denies problems  Neurologic:  Denies problems  Psychiatric:  Denies problems  Endocrine:  Denies problems  Heme/Lymphatic:  Denies problems  Allergic/Immunologic:  Denies problems    Objective:      Vitals:    05/24/18 0748   BP: 118/62   Pulse: 70   Resp: 16   Weight: 182 lb (82.6 kg)   Height: 5' 5\" (1.651 m)       Physical Exam:  General Appearance: Alert, cooperative, no distress, appears stated age  Head: Normocephalic, without obvious abnormality, atraumatic  Eyes: PERRL, conjunctiva/corneas clear, EOM's intact  Ears: Normal TM's and external ear canals, both ears  Nose: Nares normal, septum midline,mucosa normal, no drainage  Throat: Lips, mucosa, and tongue normal; teeth and gums normal  Neck: Supple, symmetrical, trachea midline, no adenopathy; thyroid: not enlarged, symmetric, no tenderness/mass/nodules; no carotid bruit  Back: Symmetric, no curvature, ROM normal, no CVA tenderness  Lungs: Clear to auscultation bilaterally, respirations unlabored  Breasts: No breast masses, tenderness, asymmetry, or nipple discharge  Heart: Regular rate and rhythm, S1 and S2 normal, no murmur, rub, or gallop, Abdomen: Soft, non-tender, bowel sounds active all four quadrants,  no masses, no organomegaly  Extremities: Extremities normal, atraumatic, no cyanosis or edema  Skin: Skin color, texture, turgor normal, no rashes or lesions  Lymph nodes: Cervical, supraclavicular, and axillary nodes normal  Neurologic: Normal        The following high BMI interventions were performed this visit: encouragement to exercise and dietary needs education    Results for orders placed or performed in visit on 05/24/18   HM2(CBC w/o Differential)   Result Value Ref Range    WBC 3.5 (L) 4.0 - 11.0 thou/uL    RBC 4.34 3.80 - 5.40 mill/uL    Hemoglobin 13.3 12.0 - 16.0 g/dL    Hematocrit 39.4 35.0 - 47.0 %    MCV 91 80 - 100 " fL    MCH 30.7 27.0 - 34.0 pg    MCHC 33.8 32.0 - 36.0 g/dL    RDW 13.4 11.0 - 14.5 %    Platelets 256 140 - 440 thou/uL    MPV 6.7 (L) 7.0 - 10.0 fL       Yovana Aleman MD    This note has been dictated using voice recognition software. Any grammatical or context distortions are unintentional and inherent to the software

## 2021-06-22 NOTE — PROGRESS NOTES
Preoperative Exam    Scheduled Procedure: Left shoulder arthroscopic decompression  Surgery Date:  1/21/19  Surgery Location: [facility], fax [phone]    Surgeon:  Dr. GONZALEZ Cheung    Assessment/Plan:     1. Preop general physical exam  Patient is approved for surgery with general anesthesia.  She was instructed to hold all supplements 1 week prior to surgery.  She will take her Synthroid the morning of surgery.  Urine pregnancy test was negative.  Hemoglobin is normal.  - Pregnancy (Beta-hCG, Qual), Urine  - Hemoglobin    2. Left shoulder tendonitis  Patient failed conservative therapy including steroid injection and physical therapy.    3. Other specified hypothyroidism  Stable on current medications.    Surgical Procedure Risk: Low (reported cardiac risk generally < 1%)  Have you had prior anesthesia?: Yes  Have you or any family members had a previous anesthesia reaction:  No  Do you or any family members have a history of a clotting or bleeding disorder?: No  Cardiac Risk Assessment: no increased risk for major cardiac complications    Patient approved for surgery with general or local anesthesia.      Functional Status: Independent  Patient plans to recover at home with family.     Subjective:      Katie Santoro is a 43 y.o. female who presents for a preoperative consultation.  She has had pain in her left shoulder for approximately 1 year.  It worsened approximately 6 months ago.  She has tried conservative therapy including physical therapy and a steroid injection.  Her symptoms have not improved.  Interestingly, she had the right shoulder done around 2004.  Her brother has had both his shoulders done.  She has had no issues with prior anesthesia.  Her chronic medical conditions include hypothyroidism which is stable on her current medication.  She has no known coronary artery disease and denies chest pain or shortness of breath.  She is up-to-date on  immunizations.  She has had no recent illnesses or exposures.    All other systems reviewed and are negative, other than those listed in the HPI.    Pertinent History  Do you have difficulty breathing or chest pain after walking up a flight of stairs: No  History of obstructive sleep apnea: No  Steroid use in the last 6 months: No  Frequent Aspirin/NSAID use: No  Prior Blood Transfusion: No  Prior Blood Transfusion Reaction: No  If for some reason prior to, during or after the procedure, if it is medically indicated, would you be willing to have a blood transfusion?:  There is no transfusion refusal.    Current Outpatient Medications   Medication Sig Dispense Refill     cholecalciferol, vitamin D3, (VITAMIN D3 ORAL) Take 5,000 Units by mouth.       levothyroxine (SYNTHROID, LEVOTHROID) 137 MCG tablet Take 137 mcg by mouth.       magnesium citrate solution Take 296 mL by mouth once.       POTASSIUM ORAL Take by mouth. Unsure of dose       No current facility-administered medications for this visit.         Allergies   Allergen Reactions     Venom-Honey Bee Swelling     PN: severe swelling     Codeine Itching     Pine Tar Rash     PN: blisters       Patient Active Problem List   Diagnosis      delivery delivered     Other specified hypothyroidism       Past Medical History:   Diagnosis Date     Disease of thyroid gland        Past Surgical History:   Procedure Laterality Date      SECTION  2010     SHOULDER SURGERY         Social History     Socioeconomic History     Marital status:      Spouse name: Not on file     Number of children: Not on file     Years of education: Not on file     Highest education level: Not on file   Social Needs     Financial resource strain: Not on file     Food insecurity - worry: Not on file     Food insecurity - inability: Not on file     Transportation needs - medical: Not on file     Transportation needs - non-medical: Not on file   Occupational History     Not on  "file   Tobacco Use     Smoking status: Never Smoker     Smokeless tobacco: Never Used   Substance and Sexual Activity     Alcohol use: No     Drug use: No     Sexual activity: Yes     Partners: Male   Other Topics Concern     Not on file   Social History Narrative     Not on file           Objective:     Vitals:    01/07/19 0914   BP: 120/64   Pulse: 76   Resp: 16   Temp: 98.4  F (36.9  C)   Weight: 198 lb (89.8 kg)   Height: 5' 5\" (1.651 m)   LMP: 12/31/2018         Physical Exam:  Physical Exam    Physical Exam:  General Appearance: Alert, cooperative, no distress, appears stated age  Head: Normocephalic, without obvious abnormality, atraumatic  Eyes: PERRL, conjunctiva/corneas clear, EOM's intact  Ears: Normal TM's and external ear canals, both ears  Nose: Nares normal, septum midline,mucosa normal, no drainage  Throat: Lips, mucosa, and tongue normal; teeth and gums normal  Neck: Supple, symmetrical, trachea midline, no adenopathy; thyroid: not enlarged, symmetric, no tenderness/mass/nodules; no carotid bruit  Back: Symmetric, no curvature, ROM normal, no CVA tenderness  Lungs: Clear to auscultation bilaterally, respirations unlabored  Heart: Regular rate and rhythm, S1 and S2 normal, no murmur, rub, or gallop, Abdomen: Soft, non-tender, bowel sounds active all four quadrants,  no masses, no organomegaly  Extremities: Extremities normal, atraumatic, no cyanosis or edema  Skin: Skin color, texture, turgor normal, no rashes or lesions  Lymph nodes: Cervical, supraclavicular, and axillary nodes normal  Neurologic: Normal        Patient Instructions     Hold all supplements, aspirin and NSAIDs for 7 days prior to surgery.  Follow your surgeon's direction on when to stop eating and drinking prior to surgery.  Your surgeon will be managing your pain after your surgery.        Labs:  Recent Results (from the past 24 hour(s))   Pregnancy (Beta-hCG, Qual), Urine    Collection Time: 01/07/19  9:35 AM   Result Value Ref " Range    Pregnancy Test, Urine Negative Negative    Specific Gravity, UA 1.025 1.001 - 1.030   Hemoglobin    Collection Time: 01/07/19  9:35 AM   Result Value Ref Range    Hemoglobin 13.5 12.0 - 16.0 g/dL       Immunization History   Administered Date(s) Administered     Influenza, seasonal,quad inj 36+ mos 11/17/2015     Td,adult,historic,unspecified 1973     Tdap 06/03/2015           Electronically signed by Yovana Aleman MD 01/07/19 9:16 AM

## 2021-06-23 ENCOUNTER — RECORDS - HEALTHEAST (OUTPATIENT)
Dept: ADMINISTRATIVE | Facility: OTHER | Age: 46
End: 2021-06-23

## 2021-06-24 NOTE — TELEPHONE ENCOUNTER
Dr. Aleman-  See pt's mychart refill request.  Last TSH done 5-24-18. Repeat labs?  Last OV was a pre-op on 1-7-19.  Rx queued for MD approval.

## 2021-07-03 NOTE — ADDENDUM NOTE
Addendum Note by Brian Rouse DO at 8/7/2020  8:15 PM     Author: Brian Rouse DO Service: -- Author Type: Physician    Filed: 8/7/2020  8:15 PM Encounter Date: 7/22/2020 Status: Signed    : Brian Rouse DO (Physician)    Addended by: BRIAN ROUSE on: 8/7/2020 08:15 PM        Modules accepted: Orders

## 2021-10-03 ENCOUNTER — HEALTH MAINTENANCE LETTER (OUTPATIENT)
Age: 46
End: 2021-10-03

## 2022-01-04 ENCOUNTER — THERAPY VISIT (OUTPATIENT)
Dept: PHYSICAL THERAPY | Facility: CLINIC | Age: 47
End: 2022-01-04
Payer: OTHER MISCELLANEOUS

## 2022-01-04 DIAGNOSIS — M54.2 NECK PAIN: Primary | ICD-10-CM

## 2022-01-04 PROCEDURE — 97110 THERAPEUTIC EXERCISES: CPT | Mod: GP

## 2022-01-04 PROCEDURE — 97161 PT EVAL LOW COMPLEX 20 MIN: CPT | Mod: GP
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
## 2022-01-12 ENCOUNTER — THERAPY VISIT (OUTPATIENT)
Dept: PHYSICAL THERAPY | Facility: CLINIC | Age: 47
End: 2022-01-12
Payer: OTHER MISCELLANEOUS

## 2022-01-12 DIAGNOSIS — M54.2 NECK PAIN: ICD-10-CM

## 2022-01-12 PROCEDURE — 97140 MANUAL THERAPY 1/> REGIONS: CPT | Mod: GP

## 2022-01-12 PROCEDURE — 97110 THERAPEUTIC EXERCISES: CPT | Mod: GP

## 2022-01-12 NOTE — PROGRESS NOTES
PROGRESS  REPORT    Progress reporting period is from initial eval 1/4/2022 to 1/12/2022.       SUBJECTIVE  Subjective changes noted by patient: Pt reports that her neck has been feeling better with the exercises, hasn't been having headaches. Main symptom at this point is tightness.      Current Pain level: 0/10.     Initial Pain level: 3/10.   Changes in function:  Yes (See Goal flowsheet attached for changes in current functional level)  Adverse reaction to treatment or activity: None    OBJECTIVE  Changes noted in objective findings:  Yes, see below.  Objective: cervical rotation L & R 70 deg (improved from eval). No pinching with cervical sidebending. Continued posterior stretch with cervical flexion.       ASSESSMENT/PLAN  Updated problem list and treatment plan: Diagnosis 1:  cervicalgia  Pain -  manual therapy, self management, education and home program  Decreased ROM/flexibility - manual therapy, therapeutic exercise, therapeutic activity and home program  Decreased strength - therapeutic exercise, therapeutic activities and home program  Impaired posture - neuro re-education, therapeutic activities and home program  STG/LTGs have been met or progress has been made towards goals:  Yes (See Goal flow sheet completed today.)  Assessment of Progress: The patient's condition is improving.  Self Management Plans:  Patient has been instructed in a home treatment program.  I have re-evaluated this patient and find that the nature, scope, duration and intensity of the therapy is appropriate for the medical condition of the patient.  Katie continues to require the following intervention to meet STG and LTG's:  PT    Recommendations:  This patient would benefit from continued therapy --- Leaving episode open at this time, but patient may not need follow up pending independent management of sxs and HEP in coming weeks.   Frequency:  1 X a month, once daily  Duration:  for 1 months        Please refer to the daily  flowsheet for treatment today, total treatment time and time spent performing 1:1 timed codes.

## 2022-01-19 ENCOUNTER — THERAPY VISIT (OUTPATIENT)
Dept: PHYSICAL THERAPY | Facility: CLINIC | Age: 47
End: 2022-01-19
Payer: OTHER MISCELLANEOUS

## 2022-01-19 DIAGNOSIS — M54.2 NECK PAIN: ICD-10-CM

## 2022-01-19 PROCEDURE — 97110 THERAPEUTIC EXERCISES: CPT | Mod: GP

## 2022-01-19 PROCEDURE — 97140 MANUAL THERAPY 1/> REGIONS: CPT | Mod: GP

## 2022-01-25 ENCOUNTER — THERAPY VISIT (OUTPATIENT)
Dept: PHYSICAL THERAPY | Facility: CLINIC | Age: 47
End: 2022-01-25
Payer: OTHER MISCELLANEOUS

## 2022-01-25 DIAGNOSIS — M54.2 NECK PAIN: ICD-10-CM

## 2022-01-25 PROCEDURE — 97140 MANUAL THERAPY 1/> REGIONS: CPT | Mod: GP

## 2022-01-25 PROCEDURE — 97110 THERAPEUTIC EXERCISES: CPT | Mod: GP

## 2022-02-01 ENCOUNTER — THERAPY VISIT (OUTPATIENT)
Dept: PHYSICAL THERAPY | Facility: CLINIC | Age: 47
End: 2022-02-01
Payer: OTHER MISCELLANEOUS

## 2022-02-01 DIAGNOSIS — M54.2 NECK PAIN: ICD-10-CM

## 2022-02-01 PROCEDURE — 97112 NEUROMUSCULAR REEDUCATION: CPT | Mod: GP | Performed by: PHYSICAL THERAPIST

## 2022-02-01 PROCEDURE — 97110 THERAPEUTIC EXERCISES: CPT | Mod: GP | Performed by: PHYSICAL THERAPIST

## 2022-02-01 PROCEDURE — 97140 MANUAL THERAPY 1/> REGIONS: CPT | Mod: GP | Performed by: PHYSICAL THERAPIST

## 2022-02-08 ENCOUNTER — THERAPY VISIT (OUTPATIENT)
Dept: PHYSICAL THERAPY | Facility: CLINIC | Age: 47
End: 2022-02-08
Payer: OTHER MISCELLANEOUS

## 2022-02-08 DIAGNOSIS — M54.2 NECK PAIN: ICD-10-CM

## 2022-02-08 PROCEDURE — 97140 MANUAL THERAPY 1/> REGIONS: CPT | Mod: GP | Performed by: PHYSICAL THERAPIST

## 2022-02-08 PROCEDURE — 97530 THERAPEUTIC ACTIVITIES: CPT | Mod: GP | Performed by: PHYSICAL THERAPIST

## 2022-02-08 PROCEDURE — 97110 THERAPEUTIC EXERCISES: CPT | Mod: GP | Performed by: PHYSICAL THERAPIST

## 2022-02-08 NOTE — PROGRESS NOTES
Subjective:  HPI  Physical Exam                    Objective:  System    Physical Exam    General     ROS    Assessment/Plan:    PROGRESS  REPORT    Progress reporting period is from 1/4/22 to 2/8/22.       SUBJECTIVE   Subjective: Having another flare up, do note that having to sleep on couch due to difficulty in bed with R UE symptoms to elbow. Notes that having stiffness from neck to shoulder blade.      Current Pain level: 4/10.      Initial Pain level: 3/10.   Changes in function:  Yes (See Goal flowsheet attached for changes in current functional level)  Adverse reaction to treatment or activity: None    OBJECTIVE  Changes noted in objective findings:  Yes, R 1st rib hypomobile, elevated. Cervical ROM extension wnl, flexion w/ posterior tightness. Rotation R 65*, tightness, L 70*.         ASSESSMENT/PLAN  Updated problem list and treatment plan: Diagnosis 1:  cervicalgia  Pain -  home program  Decreased ROM/flexibility - manual therapy, therapeutic exercise and home program  Decreased joint mobility - manual therapy, therapeutic exercise and home program  Impaired muscle performance - neuro re-education and home program  STG/LTGs have been met or progress has been made towards goals:  Yes (See Goal flow sheet completed today.)  Assessment of Progress: The patient has had set backs in their progress.  Self Management Plans:  Patient has been instructed in a home treatment program.  Patient  has been instructed in self management of symptoms.  I have re-evaluated this patient and find that the nature, scope, duration and intensity of the therapy is appropriate for the medical condition of the patient.  Katie continues to require the following intervention to meet STG and LTG's:  PT    Recommendations:  This patient would benefit from further evaluation. Given ongoing symptoms, recommend follow up with referring provider. At this time will leave episode open for follow up pending MD recommendations.     Please  refer to the daily flowsheet for treatment today, total treatment time and time spent performing 1:1 timed codes.

## 2022-02-10 ENCOUNTER — OFFICE VISIT (OUTPATIENT)
Dept: FAMILY MEDICINE | Facility: CLINIC | Age: 47
End: 2022-02-10
Payer: COMMERCIAL

## 2022-02-10 VITALS
BODY MASS INDEX: 35.52 KG/M2 | HEART RATE: 73 BPM | HEIGHT: 65 IN | DIASTOLIC BLOOD PRESSURE: 66 MMHG | SYSTOLIC BLOOD PRESSURE: 124 MMHG | WEIGHT: 213.2 LBS

## 2022-02-10 DIAGNOSIS — F32.81 PMDD (PREMENSTRUAL DYSPHORIC DISORDER): ICD-10-CM

## 2022-02-10 DIAGNOSIS — E66.812 CLASS 2 OBESITY WITHOUT SERIOUS COMORBIDITY WITH BODY MASS INDEX (BMI) OF 35.0 TO 35.9 IN ADULT, UNSPECIFIED OBESITY TYPE: ICD-10-CM

## 2022-02-10 DIAGNOSIS — H01.119 EYELID DERMATITIS, ALLERGIC/CONTACT: ICD-10-CM

## 2022-02-10 DIAGNOSIS — Z13.6 CARDIOVASCULAR SCREENING; LDL GOAL LESS THAN 160: ICD-10-CM

## 2022-02-10 DIAGNOSIS — E07.9 DISEASE OF THYROID GLAND: ICD-10-CM

## 2022-02-10 DIAGNOSIS — R87.610 ASCUS OF CERVIX WITH NEGATIVE HIGH RISK HPV: ICD-10-CM

## 2022-02-10 DIAGNOSIS — Z00.00 ROUTINE GENERAL MEDICAL EXAMINATION AT A HEALTH CARE FACILITY: Primary | ICD-10-CM

## 2022-02-10 DIAGNOSIS — Z91.030 H/O BEE STING ALLERGY: ICD-10-CM

## 2022-02-10 LAB
ALBUMIN SERPL-MCNC: 4.1 G/DL (ref 3.5–5)
ALP SERPL-CCNC: 88 U/L (ref 45–120)
ALT SERPL W P-5'-P-CCNC: 16 U/L (ref 0–45)
ANION GAP SERPL CALCULATED.3IONS-SCNC: 10 MMOL/L (ref 5–18)
AST SERPL W P-5'-P-CCNC: 18 U/L (ref 0–40)
BILIRUB SERPL-MCNC: 0.7 MG/DL (ref 0–1)
BUN SERPL-MCNC: 15 MG/DL (ref 8–22)
CALCIUM SERPL-MCNC: 8.9 MG/DL (ref 8.5–10.5)
CHLORIDE BLD-SCNC: 104 MMOL/L (ref 98–107)
CHOLEST SERPL-MCNC: 182 MG/DL
CO2 SERPL-SCNC: 24 MMOL/L (ref 22–31)
CREAT SERPL-MCNC: 0.82 MG/DL (ref 0.6–1.1)
ERYTHROCYTE [DISTWIDTH] IN BLOOD BY AUTOMATED COUNT: 12.7 % (ref 10–15)
FASTING STATUS PATIENT QL REPORTED: YES
GFR SERPL CREATININE-BSD FRML MDRD: 88 ML/MIN/1.73M2
GLUCOSE BLD-MCNC: 90 MG/DL (ref 70–125)
HBA1C MFR BLD: 5.3 %
HCT VFR BLD AUTO: 39 % (ref 35–47)
HDLC SERPL-MCNC: 36 MG/DL
HGB BLD-MCNC: 13.4 G/DL (ref 11.7–15.7)
LDLC SERPL CALC-MCNC: 118 MG/DL
MCH RBC QN AUTO: 31.2 PG (ref 26.5–33)
MCHC RBC AUTO-ENTMCNC: 34.4 G/DL (ref 31.5–36.5)
MCV RBC AUTO: 91 FL (ref 78–100)
PLATELET # BLD AUTO: 284 10E3/UL (ref 150–450)
POTASSIUM BLD-SCNC: 4.4 MMOL/L (ref 3.5–5)
PROT SERPL-MCNC: 6.8 G/DL (ref 6–8)
RBC # BLD AUTO: 4.3 10E6/UL (ref 3.8–5.2)
SODIUM SERPL-SCNC: 138 MMOL/L (ref 136–145)
TRIGL SERPL-MCNC: 140 MG/DL
TSH SERPL DL<=0.005 MIU/L-ACNC: 1.7 UIU/ML (ref 0.3–5)
WBC # BLD AUTO: 5 10E3/UL (ref 4–11)

## 2022-02-10 PROCEDURE — 80061 LIPID PANEL: CPT | Performed by: FAMILY MEDICINE

## 2022-02-10 PROCEDURE — 84443 ASSAY THYROID STIM HORMONE: CPT | Performed by: FAMILY MEDICINE

## 2022-02-10 PROCEDURE — 99213 OFFICE O/P EST LOW 20 MIN: CPT | Mod: 25 | Performed by: FAMILY MEDICINE

## 2022-02-10 PROCEDURE — 99396 PREV VISIT EST AGE 40-64: CPT | Performed by: FAMILY MEDICINE

## 2022-02-10 PROCEDURE — 36415 COLL VENOUS BLD VENIPUNCTURE: CPT | Performed by: FAMILY MEDICINE

## 2022-02-10 PROCEDURE — 85027 COMPLETE CBC AUTOMATED: CPT | Performed by: FAMILY MEDICINE

## 2022-02-10 PROCEDURE — 80053 COMPREHEN METABOLIC PANEL: CPT | Performed by: FAMILY MEDICINE

## 2022-02-10 PROCEDURE — 83036 HEMOGLOBIN GLYCOSYLATED A1C: CPT | Performed by: FAMILY MEDICINE

## 2022-02-10 RX ORDER — FLUTICASONE PROPIONATE 50 MCG
SPRAY, SUSPENSION (ML) NASAL
COMMUNITY
Start: 2020-11-20 | End: 2023-01-03 | Stop reason: ALTCHOICE

## 2022-02-10 RX ORDER — HYDROCORTISONE 25 MG/G
OINTMENT TOPICAL
COMMUNITY
Start: 2020-12-31

## 2022-02-10 RX ORDER — ERGOCALCIFEROL 1.25 MG/1
50000 CAPSULE ORAL
COMMUNITY
End: 2023-02-08

## 2022-02-10 RX ORDER — FLUTICASONE PROPIONATE 220 UG/1
1 AEROSOL, METERED RESPIRATORY (INHALATION)
COMMUNITY
Start: 2020-10-29 | End: 2022-05-16

## 2022-02-10 RX ORDER — LEVOTHYROXINE SODIUM 137 UG/1
137 TABLET ORAL DAILY
COMMUNITY
Start: 2021-01-19 | End: 2022-02-13

## 2022-02-10 RX ORDER — ALBUTEROL SULFATE 90 UG/1
2 AEROSOL, METERED RESPIRATORY (INHALATION) PRN
COMMUNITY
Start: 2020-07-21 | End: 2022-05-16

## 2022-02-10 RX ORDER — EPINEPHRINE 0.3 MG/.3ML
0.3 INJECTION SUBCUTANEOUS
COMMUNITY
Start: 2021-03-17

## 2022-02-10 ASSESSMENT — MIFFLIN-ST. JEOR: SCORE: 1594.14

## 2022-02-10 NOTE — PROGRESS NOTES
SUBJECTIVE:   CC: Katie Santoro is an 46 year old woman who presents for preventive health visit.       Patient has been advised of split billing requirements and indicates understanding: Yes  Healthy Habits:     Getting at least 3 servings of Calcium per day:  Yes    Bi-annual eye exam:  Yes    Dental care twice a year:  Yes    Sleep apnea or symptoms of sleep apnea:  Daytime drowsiness    Diet:  Gluten-free/reduced, Breakfast skipped and Other    Frequency of exercise:  4-5 days/week    Duration of exercise:  45-60 minutes    Taking medications regularly:  Yes    Medication side effects:  Not applicable    PHQ-2 Total Score: 0    Additional concerns today:  No    Katie presents for annual exam.  Since I saw her last year, she did have patch testing done with allergist for eyelid dermatitis.  They found about 6 things that she was allergic to including formaldehyde and some type of red dye.  She is been avoiding those products and has not had any recurrence of flares.  She continues with the fluoxetine for PMDD.  Her periods have been normal.  She is had some inhalers as she tends to get a prolonged cough after a virus.  She is not been labeled with asthma.  She is currently not taking them and has not used them in quite some time.  She carries an EpiPen for of prior reaction to a bee sting.  She is not been tested and I would recommend she do that to find out if she really needs to be carrying an EpiPen.  She has hypothyroidism and we have made some adjustments in her Synthroid over the last couple of years.  She is currently seeing the dietitian and working on healthy diet and exercising.  She tried to work on some weight loss.        Today's PHQ-2 Score:   PHQ-2 ( 1999 Pfizer) 2/9/2022   Q1: Little interest or pleasure in doing things 0   Q2: Feeling down, depressed or hopeless 0   PHQ-2 Score 0   Q1: Little interest or pleasure in doing things Not at all   Q2: Feeling down, depressed or hopeless Not at all    PHQ-2 Score 0       Abuse: Current or Past (Physical, Sexual or Emotional) - No  Do you feel safe in your environment? Yes    Have you ever done Advance Care Planning? (For example, a Health Directive, POLST, or a discussion with a medical provider or your loved ones about your wishes): No, advance care planning information given to patient to review.  Patient declined advance care planning discussion at this time.    Social History     Tobacco Use     Smoking status: Never Smoker     Smokeless tobacco: Never Used   Substance Use Topics     Alcohol use: No         Alcohol Use 2/9/2022   Prescreen: >3 drinks/day or >7 drinks/week? No       Reviewed orders with patient.  Reviewed health maintenance and updated orders accordingly - Yes  Lab work is in process    Breast Cancer Screening:    Breast CA Risk Assessment (FHS-7) 2/9/2022   Do you have a family history of breast, colon, or ovarian cancer? No / Unknown       Mammogram Screening: Recommended annual mammography  Pertinent mammograms are reviewed under the imaging tab.    History of abnormal Pap smear: NO - age 30- 65 PAP every 3 years recommended  PAP / HPV Latest Ref Rng & Units 1/19/2021 1/12/2017   PAP Negative for squamous intraepithelial lesion or malignancy. Atypical squamous cells of undetermined significance  Electronically signed by Castro Chadwick MD on 1/29/2021 at  4:06 PM  (A) Negative for squamous intraepithelial lesion or malignancy  Electronically signed by Yessica Cotton CT (ASCP) on 1/19/2017 at  2:08 PM     HPV16 NEG Negative -   HPV18 NEG Negative -   HRHPV NEG Negative -     Reviewed and updated as needed this visit by clinical staff  Tobacco  Allergies  Meds             Reviewed and updated as needed this visit by Provider                   Review of Systems  CONSTITUTIONAL: NEGATIVE for fever, chills, change in weight  INTEGUMENTARU/SKIN: NEGATIVE for worrisome rashes, moles or lesions  EYES: NEGATIVE for vision changes or  "irritation  ENT: NEGATIVE for ear, mouth and throat problems  RESP: NEGATIVE for significant cough or SOB  BREAST: NEGATIVE for masses, tenderness or discharge  CV: NEGATIVE for chest pain, palpitations or peripheral edema  GI: NEGATIVE for nausea, abdominal pain, heartburn, or change in bowel habits  : NEGATIVE for unusual urinary or vaginal symptoms. Periods are regular.  MUSCULOSKELETAL: NEGATIVE for significant arthralgias or myalgia  NEURO: NEGATIVE for weakness, dizziness or paresthesias  PSYCHIATRIC: NEGATIVE for changes in mood or affect     OBJECTIVE:   /66 (BP Location: Left arm, Patient Position: Sitting, Cuff Size: Adult Large)   Pulse 73   Ht 1.645 m (5' 4.76\")   Wt 96.7 kg (213 lb 3.2 oz)   LMP 02/03/2022   Breastfeeding No   BMI 35.74 kg/m    Physical Exam  GENERAL: healthy, alert and no distress  EYES: Eyes grossly normal to inspection, PERRL and conjunctivae and sclerae normal  HENT: ear canals and TM's normal, nose and mouth without ulcers or lesions  NECK: no adenopathy, no asymmetry, masses, or scars and thyroid normal to palpation  RESP: lungs clear to auscultation - no rales, rhonchi or wheezes  CV: regular rate and rhythm, normal S1 S2, no S3 or S4, no murmur, click or rub, no peripheral edema and peripheral pulses strong  ABDOMEN: soft, nontender, no hepatosplenomegaly, no masses and bowel sounds normal  MS: no gross musculoskeletal defects noted, no edema  SKIN: no suspicious lesions or rashes  NEURO: Normal strength and tone, mentation intact and speech normal  PSYCH: mentation appears normal, affect normal/bright    Diagnostic Test Results:  Labs reviewed in Epic  Results for orders placed or performed in visit on 02/10/22 (from the past 24 hour(s))   CBC with platelets   Result Value Ref Range    WBC Count 5.0 4.0 - 11.0 10e3/uL    RBC Count 4.30 3.80 - 5.20 10e6/uL    Hemoglobin 13.4 11.7 - 15.7 g/dL    Hematocrit 39.0 35.0 - 47.0 %    MCV 91 78 - 100 fL    MCH 31.2 26.5 - " 33.0 pg    MCHC 34.4 31.5 - 36.5 g/dL    RDW 12.7 10.0 - 15.0 %    Platelet Count 284 150 - 450 10e3/uL       ASSESSMENT/PLAN:   1. Routine general medical examination at a health care facility  Katie presents for annual exam.  She had a Pap smear in 21 which was ASCUS with negative HPV so should be due in 3 years in 2024.  She does had a mammogram in January that was normal.  She also had a colonoscopy in 2021 and they wanted to see her back in 7 years.  She will be due in 2028.  She is up-to-date on immunizations.  She getting regular exercise.  - CBC with platelets; Future  - Comprehensive metabolic panel; Future  - Hemoglobin A1c; Future  - Lipid panel reflex to direct LDL Fasting; Future  - TSH with free T4 reflex; Future  - CBC with platelets  - Comprehensive metabolic panel  - Hemoglobin A1c  - Lipid panel reflex to direct LDL Fasting  - TSH with free T4 reflex    2. Disease of thyroid gland  She has been fairly stable although we have done some adjustments over the last couple of years.  We will recheck her TSH today.  - TSH with free T4 reflex; Future  - TSH with free T4 reflex    3. ASCUS of cervix with negative high risk HPV  Repeat Pap in 2024.    4. CARDIOVASCULAR SCREENING; LDL GOAL LESS THAN 160  - Lipid panel reflex to direct LDL Fasting; Future  - Lipid panel reflex to direct LDL Fasting    5. PMDD (premenstrual dysphoric disorder)  Stable on her current dose of fluoxetine.  - FLUoxetine (PROZAC) 20 MG capsule; Take 1 capsule (20 mg) by mouth daily  Dispense: 90 capsule; Refill: 3    6. Class 2 obesity without serious comorbidity with body mass index (BMI) of 35.0 to 35.9 in adult, unspecified obesity type  She is at risk for prediabetes and other complications.  We will check an A1c.  She is working with a dietitian and starting to exercise.  - Hemoglobin A1c; Future  - Hemoglobin A1c    7. H/O bee sting allergy  Would recommend she get tested with the allergist to see if she really needs to have  "an EpiPen.    8. Eyelid dermatitis, allergic/contact  She did patch testing and was found to be sensitive to about 6 different products.  Since avoiding those, her symptoms have resolved.       Patient has been advised of split billing requirements and indicates understanding: Yes    COUNSELING:  Reviewed preventive health counseling, as reflected in patient instructions       Regular exercise       Healthy diet/nutrition    Estimated body mass index is 35.74 kg/m  as calculated from the following:    Height as of this encounter: 1.645 m (5' 4.76\").    Weight as of this encounter: 96.7 kg (213 lb 3.2 oz).    Weight management plan: Discussed healthy diet and exercise guidelines    She reports that she has never smoked. She has never used smokeless tobacco.      Counseling Resources:  ATP IV Guidelines  Pooled Cohorts Equation Calculator  Breast Cancer Risk Calculator  BRCA-Related Cancer Risk Assessment: FHS-7 Tool  FRAX Risk Assessment  ICSI Preventive Guidelines  Dietary Guidelines for Americans, 2010  USDA's MyPlate  ASA Prophylaxis  Lung CA Screening    Yovana Aleman  [facility]  "

## 2022-02-11 ENCOUNTER — MYC MEDICAL ADVICE (OUTPATIENT)
Dept: FAMILY MEDICINE | Facility: CLINIC | Age: 47
End: 2022-02-11
Payer: COMMERCIAL

## 2022-02-11 DIAGNOSIS — F32.81 PMDD (PREMENSTRUAL DYSPHORIC DISORDER): ICD-10-CM

## 2022-02-11 DIAGNOSIS — E07.9 DISEASE OF THYROID GLAND: Primary | ICD-10-CM

## 2022-02-13 RX ORDER — FLUOXETINE 10 MG/1
10 CAPSULE ORAL DAILY
Qty: 14 CAPSULE | Refills: 0 | Status: SHIPPED | OUTPATIENT
Start: 2022-02-13 | End: 2022-05-16

## 2022-02-13 RX ORDER — LEVOTHYROXINE SODIUM 137 UG/1
137 TABLET ORAL DAILY
Qty: 90 TABLET | Refills: 3 | Status: SHIPPED | OUTPATIENT
Start: 2022-02-13 | End: 2023-02-20

## 2022-02-24 ENCOUNTER — THERAPY VISIT (OUTPATIENT)
Dept: PHYSICAL THERAPY | Facility: CLINIC | Age: 47
End: 2022-02-24
Payer: OTHER MISCELLANEOUS

## 2022-02-24 DIAGNOSIS — M54.2 NECK PAIN: ICD-10-CM

## 2022-02-24 PROCEDURE — 97110 THERAPEUTIC EXERCISES: CPT | Mod: GP | Performed by: PHYSICAL THERAPIST

## 2022-02-24 PROCEDURE — 97140 MANUAL THERAPY 1/> REGIONS: CPT | Mod: GP | Performed by: PHYSICAL THERAPIST

## 2022-02-24 NOTE — PROGRESS NOTES
PROGRESS  REPORT    Progress reporting period is from 2/8/22 to 2/24/22.       SUBJECTIVE  \ Subjective: Pt notes that past few weeks had been doing well, however this weekend was up at cabin, neck pain flared up Friday, and with shooting pain down right arm. Cant find same relief that previously could.  Prior to this flare up, was able to do yoga, HIIT class at gym.      Current Pain level: 6/10.      Initial Pain level: 3/10.   Changes in function:  Yes (See Goal flowsheet attached for changes in current functional level)   Adverse reaction to treatment or activity: None to PT intervention, activity - pt returns to PT after 2 weeks, noting flare of symptoms, increased neck pain, R arm symptoms    OBJECTIVE  Changes noted in objective findings:  Yes,   Objective: R rotations + 45, R SB 20+, L SB 30, L rotation 50.      ASSESSMENT/PLAN  Updated problem list and treatment plan: Diagnosis 1:  Cervicalgia  Pain -  hot/cold therapy, US, electric stimulation, manual therapy, splint/taping/bracing/orthotics and home program  Decreased ROM/flexibility - manual therapy and therapeutic exercise  Decreased joint mobility - manual therapy and therapeutic exercise  Decreased strength - therapeutic exercise and therapeutic activities  Impaired posture - neuro re-education  STG/LTGs have been met or progress has been made towards goals:  Yes (See Goal flow sheet completed today.) and Yes, see for updated goals  Assessment of Progress: The patient has had set backs in their progress.  The patient's condition has exacerbated.  Self Management Plans:  Patient has been instructed in a home treatment program.  Patient  has been instructed in self management of symptoms.  I have re-evaluated this patient and find that the nature, scope, duration and intensity of the therapy is appropriate for the medical condition of the patient.  Katie continues to require the following intervention to meet STG and LTG's:   PT    Recommendations:  This patient would benefit from continued therapy.     Frequency:  1 X week, once daily  Duration:  for 6 weeks        Please refer to the daily flowsheet for treatment today, total treatment time and time spent performing 1:1 timed codes.

## 2022-02-24 NOTE — LETTER
[facility]  [address] [facility] [address]  [phone]    2022    Re: Katie Santoro   :   1975  MRN:  [id number]   REFERRING PHYSICIAN:   Violet GUPTA [facility]    Date of Initial Evaluation:  2022  Visits:  Rxs Used: 7  Reason for Referral:  Neck pain    EVALUATION SUMMARY    PROGRESS  REPORT    Progress reporting period is from 22 to 22.       SUBJECTIVE  Subjective: Pt notes that past few weeks had been doing well, however this weekend was up at cabin, neck pain flared up Friday, and with shooting pain down right arm. Cant find same relief that previously could.  Prior to this flare up, was able to do yoga, HIIT class at gym.      Current Pain level: 6/10.      Initial Pain level: 3/10.   Changes in function:  Yes (See Goal flowsheet attached for changes in current functional level)   Adverse reaction to treatment or activity: None to PT intervention, activity - pt returns to PT after 2 weeks, noting flare of symptoms, increased neck pain, R arm symptoms    OBJECTIVE  Changes noted in objective findings:  Yes,   Objective: R rotations + 45, R SB 20+, L SB 30, L rotation 50.      ASSESSMENT/PLAN  Updated problem list and treatment plan: Diagnosis 1:  Cervicalgia  Pain -  hot/cold therapy, US, electric stimulation, manual therapy, splint/taping/bracing/orthotics and home program  Decreased ROM/flexibility - manual therapy and therapeutic exercise  Decreased joint mobility - manual therapy and therapeutic exercise  Decreased strength - therapeutic exercise and therapeutic activities  Impaired posture - neuro re-education  STG/LTGs have been met or progress has been made towards goals:  Yes (See Goal flow sheet completed today.) and Yes, see for updated goals  Assessment of Progress: The patient has had set backs in their progress.  The patient's condition has exacerbated.  Self Management  Plans:  Patient has been instructed in a home treatment program.  Patient  has been instructed in self management of symptoms.  Re: Katie Santoro   :   1975        I have re-evaluated this patient and find that the nature, scope, duration and intensity of the therapy is appropriate for the medical condition of the patient.  Katie continues to require the following intervention to meet STG and LTG's:  PT    Recommendations:  This patient would benefit from continued therapy.     Frequency:  1 X week, once daily  Duration:  for 6 weeks        Thank you for your referral.    INQUIRIES  Therapist: Kia Nash PT  [facility]  [address]  [facility] [address]  Phone: [phone]

## 2022-03-03 ENCOUNTER — THERAPY VISIT (OUTPATIENT)
Dept: PHYSICAL THERAPY | Facility: CLINIC | Age: 47
End: 2022-03-03
Payer: OTHER MISCELLANEOUS

## 2022-03-03 DIAGNOSIS — M54.2 NECK PAIN: ICD-10-CM

## 2022-03-03 PROCEDURE — 97140 MANUAL THERAPY 1/> REGIONS: CPT | Mod: GP | Performed by: PHYSICAL THERAPIST

## 2022-03-16 ENCOUNTER — THERAPY VISIT (OUTPATIENT)
Dept: PHYSICAL THERAPY | Facility: CLINIC | Age: 47
End: 2022-03-16
Payer: OTHER MISCELLANEOUS

## 2022-03-16 DIAGNOSIS — M54.2 NECK PAIN: ICD-10-CM

## 2022-03-16 PROCEDURE — 97140 MANUAL THERAPY 1/> REGIONS: CPT | Mod: GP | Performed by: PHYSICAL THERAPIST

## 2022-03-16 PROCEDURE — 97112 NEUROMUSCULAR REEDUCATION: CPT | Mod: GP | Performed by: PHYSICAL THERAPIST

## 2022-05-10 PROBLEM — M54.2 NECK PAIN: Status: RESOLVED | Noted: 2022-01-04 | Resolved: 2022-05-10

## 2022-05-10 NOTE — PROGRESS NOTES
Discharge Note    Progress reporting period is from last progress note on 2/24/22  to Mar 16, 2022.    Katie failed to follow up and current status is unknown.  Please see information below for last relevant information on current status.  Patient seen for 9 visits.    SUBJECTIVE  Subjective changes noted by patient:  Pt notes that was in [place] last week, and notes that did very well with pain while there. Notes that today is starting to feel it again today. Had massage before left which was helpful. Does have appt w/ [facility] spine spcialist. First full day of work yesterday - sitting at computer.   .  Current pain level is 2/10.     Previous pain level was  3/10.   Changes in function:  Yes (See Goal flowsheet attached for changes in current functional level)  Adverse reaction to treatment or activity: None    OBJECTIVE  Changes noted in objective findings: R> L tightness     ASSESSMENT/PLAN  Diagnosis: Cervicalgia   Updated problem list and treatment plan:   Pain - HEP  Decreased ROM/flexibility - HEP  Decreased strength - HEP  Impaired muscle performance - HEP  STG/LTGs have been met or progress has been made towards goals:  Yes, please see goal flowsheet for most current information  Assessment of Progress: current status is unknown.    Last current status: Pt is progressing well   Self Management Plans:  HEP  I have re-evaluated this patient and find that the nature, scope, duration and intensity of the therapy is appropriate for the medical condition of the patient.  Katie continues to require the following intervention to meet STG and LTG's:  HEP.    Recommendations:  Discharge with current home program.  Patient to follow up with MD as needed.    Please refer to the daily flowsheet for treatment today, total treatment time and time spent performing 1:1 timed codes.

## 2022-05-16 ENCOUNTER — VIRTUAL VISIT (OUTPATIENT)
Dept: FAMILY MEDICINE | Facility: CLINIC | Age: 47
End: 2022-05-16
Payer: COMMERCIAL

## 2022-05-16 DIAGNOSIS — U07.1 INFECTION DUE TO 2019 NOVEL CORONAVIRUS: Primary | ICD-10-CM

## 2022-05-16 DIAGNOSIS — J45.909 MILD REACTIVE AIRWAYS DISEASE, UNSPECIFIED WHETHER PERSISTENT: ICD-10-CM

## 2022-05-16 PROCEDURE — 99213 OFFICE O/P EST LOW 20 MIN: CPT | Mod: GT | Performed by: FAMILY MEDICINE

## 2022-05-16 RX ORDER — ALBUTEROL SULFATE 90 UG/1
2 AEROSOL, METERED RESPIRATORY (INHALATION) PRN
Qty: 18 G | Refills: 0 | Status: SHIPPED | OUTPATIENT
Start: 2022-05-16

## 2022-05-16 RX ORDER — FLUTICASONE PROPIONATE 220 UG/1
1 AEROSOL, METERED RESPIRATORY (INHALATION) 2 TIMES DAILY
Qty: 12 G | Refills: 1 | Status: SHIPPED | OUTPATIENT
Start: 2022-05-16

## 2022-05-16 NOTE — PROGRESS NOTES
Katie is a 46 year old who is being evaluated via a billable video visit.      How would you like to obtain your AVS? MyChart  If the video visit is dropped, the invitation should be resent by: Text to cell phone: [phone]   Will anyone else be joining your video visit? No      Video Start Time: 04:40 pm    Assessment & Plan     Infection due to 2019 novel coronavirus    Mild reactive airways disease, unspecified whether persistent  - albuterol (PROAIR HFA/PROVENTIL HFA/VENTOLIN HFA) 108 (90 Base) MCG/ACT inhaler; Inhale 2 puffs into the lungs as needed for shortness of breath / dyspnea or wheezing  - fluticasone (FLOVENT HFA) 220 MCG/ACT inhaler; Inhale 1 puff into the lungs 2 times daily  Discussed symptomatic care with patient including vitamin C, Tylenol as needed,  monitor oxygen at home with a pulse ox, also discussed treatment included Paxlovid  weight, patient not a candidate because she is fully vaccinated and does not have risk factor.  Review of external notes as documented elsewhere in note  13 minutes spent on the date of the encounter doing chart review, review of outside records, review of test results, interpretation of tests, patient visit and documentation             - Resume Albuterol as needed, Flovent.  SELF MONITORING:       - Please monitor pulse    No follow-ups on file.    Rubén Bardales MD  [facility]    Subjective   Katie is a 46 year old who presents for the following health issues     HPI     She tested positive for COVID-19 x2 at home today, she was exposed through her  few days ago, she is having a mild cough, sinus type of headaches, no difficulty breathing.  No fever.  Has used an inhaler included albuterol and Flovent during viral infection in the past.  Asking for a refill.  She is COVID-19 vaccinated and boosted x1      Review of Systems   Constitutional, HEENT, cardiovascular, pulmonary, gi and gu systems are negative, except as  otherwise noted.      Objective           Vitals:  No vitals were obtained today due to virtual visit.    Physical Exam   GENERAL: Healthy, alert and no distress  EYES: Eyes grossly normal to inspection.  No discharge or erythema, or obvious scleral/conjunctival abnormalities.  RESP: No audible wheeze, cough, or visible cyanosis.  No visible retractions or increased work of breathing.    SKIN: Visible skin clear. No significant rash, abnormal pigmentation or lesions.  NEURO: Cranial nerves grossly intact.  Mentation and speech appropriate for age.  PSYCH: Mentation appears normal, affect normal/bright, judgement and insight intact, normal speech and appearance well-groomed.            Video-Visit Details    Type of service:  Video Visit    Video End Time:5:00 PM    Originating Location (pt. Location): Home    Distant Location (provider location):  [facility]     Platform used for Video Visit: [email or web address]

## 2022-06-21 NOTE — PROGRESS NOTES
Subjective:  HPI                    Objective:  System    Physical Exam    General     ROS    Assessment/Plan:    DISCHARGE REPORT    Progress reporting period is from 11/16/18 to 12/14/18.       SUBJECTIVE  Subjective changes noted by patient:    Subjective: Katie continues to have pain in anterior shoulder.  Posterior shoulder does not hurt and can do exercises for scap stability and rot cuff strength, avoiding heavy lifitng for arms that may aggravate shoulder more.      Changes in function:  Yes (See Goal flowsheet attached for changes in current functional level)  Adverse reaction to treatment or activity: None    OBJECTIVE  Changes noted in objective findings:  Yes,   Objective: Full AROM, PROM after stretching - initally flex, IR, ER mildly tight. Strength: 5/5 shoulder flexion, extension, ER all without pain, resisted IR 5/5 with some pain anteriorly.  Tender biceps tendon and pectoral mms.  Hypertonic left upper trap.  Positive Cam Kirill impingement, negative Neer impingement     ASSESSMENT/PLAN  Updated problem list and treatment plan: Diagnosis 1:  Shoulder pain  Pain -  home program  Decreased joint mobility - home program  STG/LTGs have been met or progress has been made towards goals:  Yes (See Goal flow sheet completed today.)  Assessment of Progress: The patient's condition has potential to improve.  Self Management Plans:  Patient is independent in a home treatment program.  I have re-evaluated this patient and find that the nature, scope, duration and intensity of the therapy is appropriate for the medical condition of the patient.  Katie continues to require the following intervention to meet STG and LTG's:  PT intervention is no longer required to meet STG/LTG.    Recommendations:  Patient would benefit from the following:  Will see MD today and possibly have injection in anterior shoulder as the last injection in posterior shoulder was helpful for that pain.  She may return if not  continuing to progress after that.            Please refer to the daily flowsheet for treatment today, total treatment time and time spent performing 1:1 timed codes.             Aklief counseling:  Patient advised to apply a pea-sized amount only at bedtime and wait 30 minutes after washing their face before applying.  If too drying, patient may add a non-comedogenic moisturizer.  The most commonly reported side effects including irritation, redness, scaling, dryness, stinging, burning, itching, and increased risk of sunburn.  The patient verbalized understanding of the proper use and possible adverse effects of retinoids.  All of the patient's questions and concerns were addressed.

## 2022-09-11 ENCOUNTER — HEALTH MAINTENANCE LETTER (OUTPATIENT)
Age: 47
End: 2022-09-11

## 2022-09-22 ENCOUNTER — TRANSFERRED RECORDS (OUTPATIENT)
Dept: HEALTH INFORMATION MANAGEMENT | Facility: CLINIC | Age: 47
End: 2022-09-22

## 2022-10-11 ENCOUNTER — TRANSFERRED RECORDS (OUTPATIENT)
Dept: HEALTH INFORMATION MANAGEMENT | Facility: CLINIC | Age: 47
End: 2022-10-11

## 2022-12-05 ENCOUNTER — E-VISIT (OUTPATIENT)
Dept: FAMILY MEDICINE | Facility: CLINIC | Age: 47
End: 2022-12-05
Payer: COMMERCIAL

## 2022-12-05 DIAGNOSIS — L71.0 PERIORAL DERMATITIS: Primary | ICD-10-CM

## 2022-12-05 PROCEDURE — 99421 OL DIG E/M SVC 5-10 MIN: CPT | Performed by: FAMILY MEDICINE

## 2022-12-06 RX ORDER — DOXYCYCLINE 100 MG/1
100 CAPSULE ORAL DAILY
Qty: 30 CAPSULE | Refills: 2 | Status: SHIPPED | OUTPATIENT
Start: 2022-12-06 | End: 2023-01-03

## 2022-12-13 ENCOUNTER — MYC MEDICAL ADVICE (OUTPATIENT)
Dept: FAMILY MEDICINE | Facility: CLINIC | Age: 47
End: 2022-12-13

## 2022-12-26 NOTE — TELEPHONE ENCOUNTER
Can you help her set up weight appt?  Also, she scheduled a physical for feb for only 20 min, this will need to be rescheduled at the same time.  Thanks.

## 2023-01-03 ENCOUNTER — OFFICE VISIT (OUTPATIENT)
Dept: FAMILY MEDICINE | Facility: CLINIC | Age: 48
End: 2023-01-03
Payer: COMMERCIAL

## 2023-01-03 VITALS
HEART RATE: 75 BPM | WEIGHT: 223 LBS | RESPIRATION RATE: 16 BRPM | OXYGEN SATURATION: 98 % | BODY MASS INDEX: 37.15 KG/M2 | SYSTOLIC BLOOD PRESSURE: 128 MMHG | HEIGHT: 65 IN | DIASTOLIC BLOOD PRESSURE: 82 MMHG | TEMPERATURE: 97.8 F

## 2023-01-03 DIAGNOSIS — Z00.00 HEALTHCARE MAINTENANCE: ICD-10-CM

## 2023-01-03 DIAGNOSIS — R07.0 THROAT PAIN: ICD-10-CM

## 2023-01-03 DIAGNOSIS — J02.9 ACUTE PHARYNGITIS, UNSPECIFIED ETIOLOGY: Primary | ICD-10-CM

## 2023-01-03 LAB
DEPRECATED S PYO AG THROAT QL EIA: NEGATIVE
GROUP A STREP BY PCR: NOT DETECTED

## 2023-01-03 PROCEDURE — 87651 STREP A DNA AMP PROBE: CPT | Performed by: NURSE PRACTITIONER

## 2023-01-03 PROCEDURE — 99213 OFFICE O/P EST LOW 20 MIN: CPT | Performed by: NURSE PRACTITIONER

## 2023-01-03 RX ORDER — LISDEXAMFETAMINE DIMESYLATE 60 MG/1
60 CAPSULE ORAL EVERY MORNING
COMMUNITY
End: 2023-02-08

## 2023-01-03 ASSESSMENT — ASTHMA QUESTIONNAIRES
ACT_TOTALSCORE: 25
QUESTION_3 LAST FOUR WEEKS HOW OFTEN DID YOUR ASTHMA SYMPTOMS (WHEEZING, COUGHING, SHORTNESS OF BREATH, CHEST TIGHTNESS OR PAIN) WAKE YOU UP AT NIGHT OR EARLIER THAN USUAL IN THE MORNING: NOT AT ALL
QUESTION_4 LAST FOUR WEEKS HOW OFTEN HAVE YOU USED YOUR RESCUE INHALER OR NEBULIZER MEDICATION (SUCH AS ALBUTEROL): NOT AT ALL
QUESTION_2 LAST FOUR WEEKS HOW OFTEN HAVE YOU HAD SHORTNESS OF BREATH: NOT AT ALL
QUESTION_5 LAST FOUR WEEKS HOW WOULD YOU RATE YOUR ASTHMA CONTROL: COMPLETELY CONTROLLED
ACT_TOTALSCORE: 25
QUESTION_1 LAST FOUR WEEKS HOW MUCH OF THE TIME DID YOUR ASTHMA KEEP YOU FROM GETTING AS MUCH DONE AT WORK, SCHOOL OR AT HOME: NONE OF THE TIME

## 2023-01-03 ASSESSMENT — ENCOUNTER SYMPTOMS
SORE THROAT: 1
FATIGUE: 0
COUGH: 1
FEVER: 0

## 2023-01-03 NOTE — PROGRESS NOTES
Assessment/Plan:   1. Acute pharyngitis, unspecified etiology  Rapid strep testing was negative. Likely viral in nature and self limiting. Treat symptomatically.  Pseudoephedrine (Sudafed) 120 mg every 12 hours as needed for nasal congestion.  Take Ibuprofen 400 mg with Acetaminophen (Tylenol) 1000 mg every 6 hours for acute pain.  Do not take more than 4000 mg of acetaminophen (Tylenol) in a 24 hour period.   Benzocaine-menthol 6-10 mg suck on 1lozenge every 2 hrs for throat pain.     2. Throat pain  - Streptococcus A Rapid Screen w/Reflex to PCR - Clinic Collect  - Influenza A & B Antigen - Clinic Collect  - Group A Streptococcus PCR Throat Swab    3. Healthcare maintenance  - REVIEW OF HEALTH MAINTENANCE PROTOCOL ORDERS    Return in about 1 week (around 1/10/2023) for Follow up.      Alma Delia Wilson is a 47 year old, presenting for the following health issues:  Pharyngitis      Pharyngitis   Associated symptoms include cough.   History of Present Illness       Reason for visit:  Extremely sore throat, cold symptoms  Symptom onset:  3-7 days ago  Symptoms include:  Extremely sore throat, hard to swallow  Symptom intensity:  Severe  Symptom progression:  Worsening  Had these symptoms before:  No  What makes it worse:  Swallowing  What makes it better:  No    She eats 2-3 servings of fruits and vegetables daily.She consumes 1 sweetened beverage(s) daily.She exercises with enough effort to increase her heart rate 10 to 19 minutes per day.  She exercises with enough effort to increase her heart rate 3 or less days per week. She is missing 1 dose(s) of medications per week.  She is not taking prescribed medications regularly due to remembering to take.       Symptoms of severe sore throat, post nasal drip, minor cough. Denies major nasal congestion, no fevers, chills, body aches, malaise. No known exposures. Still has tonsils.     Review of Systems   Constitutional: Negative for fatigue and fever.   HENT: Positive  "for postnasal drip and sore throat.    Respiratory: Positive for cough.    All other systems reviewed and are negative.         Objective    /82 (BP Location: Right arm, Patient Position: Sitting, Cuff Size: Adult Large)   Pulse 75   Temp 97.8  F (36.6  C) (Tympanic)   Resp 16   Ht 1.645 m (5' 4.75\")   Wt 101.2 kg (223 lb)   LMP 11/10/2022 (Approximate)   SpO2 98%   BMI 37.40 kg/m    Body mass index is 37.4 kg/m .  Physical Exam  Constitutional:       Appearance: Normal appearance.   HENT:      Head: Normocephalic.      Right Ear: Tympanic membrane, ear canal and external ear normal.      Left Ear: Tympanic membrane, ear canal and external ear normal.      Mouth/Throat:      Pharynx: Posterior oropharyngeal erythema present. No oropharyngeal exudate.      Tonsils: No tonsillar exudate or tonsillar abscesses. 1+ on the right. 1+ on the left.   Cardiovascular:      Rate and Rhythm: Regular rhythm.      Heart sounds: Normal heart sounds.   Pulmonary:      Effort: Pulmonary effort is normal.      Breath sounds: Normal breath sounds.   Lymphadenopathy:      Cervical: Cervical adenopathy present.   Skin:     General: Skin is warm and dry.   Neurological:      Mental Status: She is alert and oriented to person, place, and time.   Psychiatric:         Mood and Affect: Mood normal.         Behavior: Behavior normal.         Thought Content: Thought content normal.         Judgment: Judgment normal.        "

## 2023-01-03 NOTE — PATIENT INSTRUCTIONS
Pseudoephedrine (Sudafed) 120 mg every 12 hours as needed for nasal congestion.  Take Ibuprofen 400 mg with Acetaminophen (Tylenol) 1000 mg every 6 hours for acute pain.  Do not take more than 4000 mg of acetaminophen (Tylenol) in a 24 hour period.   Benzocaine-menthol 6-10 mg suck on 1lozenge every 2 hrs for throat pain.

## 2023-01-24 ENCOUNTER — TRANSFERRED RECORDS (OUTPATIENT)
Dept: MULTI SPECIALTY CLINIC | Facility: CLINIC | Age: 48
End: 2023-01-24
Payer: COMMERCIAL

## 2023-02-07 ASSESSMENT — ENCOUNTER SYMPTOMS
CONSTIPATION: 0
HEARTBURN: 0
PALPITATIONS: 0
JOINT SWELLING: 0
FREQUENCY: 0
ABDOMINAL PAIN: 0
COUGH: 0
HEMATURIA: 0
SHORTNESS OF BREATH: 0
SORE THROAT: 0
EYE PAIN: 0
FEVER: 0
NERVOUS/ANXIOUS: 0
ARTHRALGIAS: 1
PARESTHESIAS: 0
MYALGIAS: 0
WEAKNESS: 0
DIZZINESS: 0
DIARRHEA: 0
HEADACHES: 0
DYSURIA: 0
BREAST MASS: 0
CHILLS: 0
NAUSEA: 0
HEMATOCHEZIA: 0

## 2023-02-08 ENCOUNTER — OFFICE VISIT (OUTPATIENT)
Dept: FAMILY MEDICINE | Facility: CLINIC | Age: 48
End: 2023-02-08
Payer: COMMERCIAL

## 2023-02-08 VITALS
HEIGHT: 65 IN | DIASTOLIC BLOOD PRESSURE: 74 MMHG | WEIGHT: 216.8 LBS | RESPIRATION RATE: 16 BRPM | SYSTOLIC BLOOD PRESSURE: 128 MMHG | OXYGEN SATURATION: 98 % | HEART RATE: 86 BPM | BODY MASS INDEX: 36.12 KG/M2 | TEMPERATURE: 97.9 F

## 2023-02-08 DIAGNOSIS — Z91.030 BEE ALLERGY STATUS: ICD-10-CM

## 2023-02-08 DIAGNOSIS — F32.81 PMDD (PREMENSTRUAL DYSPHORIC DISORDER): ICD-10-CM

## 2023-02-08 DIAGNOSIS — E66.812 CLASS 2 OBESITY WITHOUT SERIOUS COMORBIDITY WITH BODY MASS INDEX (BMI) OF 36.0 TO 36.9 IN ADULT, UNSPECIFIED OBESITY TYPE: ICD-10-CM

## 2023-02-08 DIAGNOSIS — R87.610 ASCUS OF CERVIX WITH NEGATIVE HIGH RISK HPV: ICD-10-CM

## 2023-02-08 DIAGNOSIS — H01.119 EYELID DERMATITIS, ALLERGIC/CONTACT: ICD-10-CM

## 2023-02-08 DIAGNOSIS — Z13.220 LIPID SCREENING: ICD-10-CM

## 2023-02-08 DIAGNOSIS — E07.9 DISEASE OF THYROID GLAND: ICD-10-CM

## 2023-02-08 DIAGNOSIS — Z00.00 ROUTINE GENERAL MEDICAL EXAMINATION AT A HEALTH CARE FACILITY: Primary | ICD-10-CM

## 2023-02-08 LAB
CHOLEST SERPL-MCNC: 184 MG/DL
HDLC SERPL-MCNC: 41 MG/DL
LDLC SERPL CALC-MCNC: 117 MG/DL
NONHDLC SERPL-MCNC: 143 MG/DL
TRIGL SERPL-MCNC: 129 MG/DL

## 2023-02-08 PROCEDURE — 80061 LIPID PANEL: CPT | Performed by: FAMILY MEDICINE

## 2023-02-08 PROCEDURE — 99214 OFFICE O/P EST MOD 30 MIN: CPT | Mod: 25 | Performed by: FAMILY MEDICINE

## 2023-02-08 PROCEDURE — 99396 PREV VISIT EST AGE 40-64: CPT | Performed by: FAMILY MEDICINE

## 2023-02-08 PROCEDURE — 36415 COLL VENOUS BLD VENIPUNCTURE: CPT | Performed by: FAMILY MEDICINE

## 2023-02-08 RX ORDER — LISDEXAMFETAMINE DIMESYLATE 60 MG/1
60 CAPSULE ORAL EVERY MORNING
COMMUNITY
Start: 2022-11-28

## 2023-02-08 RX ORDER — OMEGA-3 FATTY ACIDS/FISH OIL 300-1000MG
CAPSULE ORAL
COMMUNITY
Start: 2022-02-01

## 2023-02-08 ASSESSMENT — ENCOUNTER SYMPTOMS
MYALGIAS: 0
HEMATOCHEZIA: 0
HEARTBURN: 0
ABDOMINAL PAIN: 0
FEVER: 0
SORE THROAT: 0
BREAST MASS: 0
DYSURIA: 0
CONSTIPATION: 0
ARTHRALGIAS: 1
CHILLS: 0
PARESTHESIAS: 0
DIZZINESS: 0
PALPITATIONS: 0
NERVOUS/ANXIOUS: 0
FREQUENCY: 0
COUGH: 0
SHORTNESS OF BREATH: 0
EYE PAIN: 0
DIARRHEA: 0
WEAKNESS: 0
HEMATURIA: 0
NAUSEA: 0
HEADACHES: 0
JOINT SWELLING: 0

## 2023-02-08 NOTE — PROGRESS NOTES
SUBJECTIVE:   CC: Katie is an 47 year old who presents for preventive health visit.   Patient has been advised of split billing requirements and indicates understanding: Yes  Healthy Habits:     Getting at least 3 servings of Calcium per day:  Yes    Bi-annual eye exam:  Yes    Dental care twice a year:  Yes    Sleep apnea or symptoms of sleep apnea:  None    Diet:  Gluten-free/reduced    Frequency of exercise:  2-3 days/week    Duration of exercise:  15-30 minutes    Taking medications regularly:  Yes    Medication side effects:  Not applicable    PHQ-2 Total Score: 0    Additional concerns today:  No    Katie presents for annual exam.  She has been frustrated with inability to lose weight.  She just saw another provider who she describes as a .  She did a bunch of blood work which was mostly essentially normal.  Her thyroid peroxidase antibodies were positive which is to be expected with Hashimoto's hypothyroidism.  Her sed rate was just a little bit elevated but other than that her labs looked good including an A1c and her FSH level.  She has not had a menses now in about 3 months so is thinking she may be perimenopausal.  She was told by this health  to go dairy free and gluten-free but high-protein.  She is really struggling she did not notice much difference with going gluten-free.  She does feel like she has a little bit of a sensitivity to dairy but does not like to eat a lot of meat so is having some questions about getting enough protein.  She will be following up with me through the weight loss program and I asked that she start tracking her food intake so we can review that when she comes back in.          Today's PHQ-2 Score:   PHQ-2 ( 1999 Pfizer) 2/7/2023   Q1: Little interest or pleasure in doing things 0   Q2: Feeling down, depressed or hopeless 0   PHQ-2 Score 0   Q1: Little interest or pleasure in doing things Not at all   Q2: Feeling down, depressed or hopeless Not at all    PHQ-2 Score 0           Social History     Tobacco Use     Smoking status: Never     Smokeless tobacco: Never   Substance Use Topics     Alcohol use: No         Alcohol Use 2/7/2023   Prescreen: >3 drinks/day or >7 drinks/week? No       Reviewed orders with patient.  Reviewed health maintenance and updated orders accordingly - Yes  Lab work is in process    Breast Cancer Screening:    Breast CA Risk Assessment (FHS-7) 2/9/2022   Do you have a family history of breast, colon, or ovarian cancer? No / Unknown         Mammogram Screening: Recommended annual mammography  Pertinent mammograms are reviewed under the imaging tab.    History of abnormal Pap smear: YES - updated in Problem List and Health Maintenance accordingly  PAP / HPV Latest Ref Rng & Units 1/19/2021 1/12/2017   PAP Negative for squamous intraepithelial lesion or malignancy. Atypical squamous cells of undetermined significance  Electronically signed by Castro Chadwick MD on 1/29/2021 at  4:06 PM  (A) Negative for squamous intraepithelial lesion or malignancy  Electronically signed by Yessica Cotton CT (ASCP) on 1/19/2017 at  2:08 PM     HPV16 NEG Negative -   HPV18 NEG Negative -   HRHPV NEG Negative -     Reviewed and updated as needed this visit by clinical staff   Tobacco  Allergies               Reviewed and updated as needed this visit by Provider                     Review of Systems   Constitutional: Negative for chills and fever.   HENT: Negative for congestion, ear pain, hearing loss and sore throat.    Eyes: Negative for pain and visual disturbance.   Respiratory: Negative for cough and shortness of breath.    Cardiovascular: Negative for chest pain, palpitations and peripheral edema.   Gastrointestinal: Negative for abdominal pain, constipation, diarrhea, heartburn, hematochezia and nausea.   Breasts:  Negative for tenderness, breast mass and discharge.   Genitourinary: Negative for dysuria, frequency, genital sores, hematuria,  "pelvic pain, urgency, vaginal bleeding and vaginal discharge.   Musculoskeletal: Positive for arthralgias. Negative for joint swelling and myalgias.   Skin: Negative for rash.   Neurological: Negative for dizziness, weakness, headaches and paresthesias.   Psychiatric/Behavioral: Negative for mood changes. The patient is not nervous/anxious.           OBJECTIVE:   /74   Pulse 86   Temp 97.9  F (36.6  C)   Resp 16   Ht 1.638 m (5' 4.5\")   Wt 98.3 kg (216 lb 12.8 oz)   LMP  (LMP Unknown)   SpO2 98%   BMI 36.64 kg/m    Physical Exam  GENERAL: healthy, alert and no distress  EYES: Eyes grossly normal to inspection, PERRL and conjunctivae and sclerae normal  HENT: ear canals and TM's normal, nose and mouth without ulcers or lesions  NECK: no adenopathy, no asymmetry, masses, or scars and thyroid normal to palpation  RESP: lungs clear to auscultation - no rales, rhonchi or wheezes  CV: regular rate and rhythm, normal S1 S2, no S3 or S4, no murmur, click or rub, no peripheral edema and peripheral pulses strong  ABDOMEN: soft, nontender, no hepatosplenomegaly, no masses and bowel sounds normal  MS: no gross musculoskeletal defects noted, no edema  SKIN: no suspicious lesions or rashes  NEURO: Normal strength and tone, mentation intact and speech normal  PSYCH: mentation appears normal, affect normal/bright    Diagnostic Test Results:  Labs reviewed in Epic  No results found for this or any previous visit (from the past 24 hour(s)).    ASSESSMENT/PLAN:   1. Routine general medical examination at a health care facility  Katie presents for her annual exam.  Her last Pap smear was ASCUS with negative HPV so were due to recheck in 2024.  She just had a mammogram and she is up-to-date on colonoscopy.  She will be due in 2028.  Reviewed the blood work that she recently had done.    2. PMDD (premenstrual dysphoric disorder)  She continues on the fluoxetine.  She stopped for short period of time but noticed increased " "irritability so restarted.  She is now been without a menses for about 3 months so she may be perimenopausal but her most recent FSH was normal.  We will monitor for now and she will let me know if she is going more than 6 months without a menses.    3. Disease of thyroid gland  She has been fairly stable on her dose of Synthroid.  She just had thyroid labs done which were normal.    4. ASCUS of cervix with negative high risk HPV  Repeat 2024.    5. Lipid screening  She would like to do screening as this runs in her family.  - Lipid panel reflex to direct LDL Fasting; Future  - Lipid panel reflex to direct LDL Fasting    6. Class 2 obesity without serious comorbidity with body mass index (BMI) of 36.0 to 36.9 in adult, unspecified obesity type  She will be following up with me next week for comprehensive weight management intake.  I did give her the packet today in the meantime she will try tracking her food intake.  We discussed trying to keep carbs down and proteins up.  I am not sure she needs to go dairy free but could try just doing lactose-free to see if this improves her GI symptoms.    7. Bee allergy status  She does carry an EpiPen but has never been formally evaluated.  She has seen an allergist in the past and I recommend that she follow-up with them to see if she still needs to carry an EpiPen.    8. Eyelid dermatitis, allergic/contact  She did patch testing a few years ago and was reactive to about 6 things.  This has been well controlled since avoiding those things but occasionally she will have a flare.    Patient has been advised of split billing requirements and indicates understanding: Yes      COUNSELING:  Reviewed preventive health counseling, as reflected in patient instructions       Regular exercise       Healthy diet/nutrition      BMI:   Estimated body mass index is 36.64 kg/m  as calculated from the following:    Height as of this encounter: 1.638 m (5' 4.5\").    Weight as of this " encounter: 98.3 kg (216 lb 12.8 oz).   Weight management plan: Specific weight management program called Comprehensive weight management discussed      She reports that she has never smoked. She has never used smokeless tobacco.      Yovana Aleman MD  [facility]

## 2023-02-16 DIAGNOSIS — E07.9 DISEASE OF THYROID GLAND: ICD-10-CM

## 2023-02-17 NOTE — TELEPHONE ENCOUNTER
"Routing refill request to provider for review/approval because:  Labs not current:  tsh    Last Written Prescription Date:  2/13/22  Last Fill Quantity: 90,  # refills: 3   Last office visit provider:  2/8/23     Requested Prescriptions   Pending Prescriptions Disp Refills     levothyroxine (SYNTHROID/LEVOTHROID) 137 MCG tablet [Pharmacy Med Name: LEVOTHYROXINE 137 MCG TABLET] 90 tablet 3     Sig: TAKE 1 TABLET BY MOUTH EVERY DAY       Thyroid Protocol Failed - 2/16/2023  8:32 AM        Failed - Normal TSH on file in past 12 months     Recent Labs   Lab Test 02/10/22  0724   TSH 1.70              Passed - Patient is 12 years or older        Passed - Recent (12 mo) or future (30 days) visit within the authorizing provider's specialty     Patient has had an office visit with the authorizing provider or a provider within the authorizing providers department within the previous 12 mos or has a future within next 30 days. See \"Patient Info\" tab in inbasket, or \"Choose Columns\" in Meds & Orders section of the refill encounter.              Passed - Medication is active on med list        Passed - No active pregnancy on record     If patient is pregnant or has had a positive pregnancy test, please check TSH.          Passed - No positive pregnancy test in past 12 months     If patient is pregnant or has had a positive pregnancy test, please check TSH.               Kelsey Shetty RN 02/17/23 3:05 PM  "

## 2023-02-20 RX ORDER — LEVOTHYROXINE SODIUM 137 UG/1
TABLET ORAL
Qty: 90 TABLET | Refills: 3 | Status: SHIPPED | OUTPATIENT
Start: 2023-02-20 | End: 2024-04-05

## 2023-04-21 ENCOUNTER — TRANSFERRED RECORDS (OUTPATIENT)
Dept: HEALTH INFORMATION MANAGEMENT | Facility: CLINIC | Age: 48
End: 2023-04-21
Payer: COMMERCIAL

## 2023-05-10 ENCOUNTER — TRANSFERRED RECORDS (OUTPATIENT)
Dept: HEALTH INFORMATION MANAGEMENT | Facility: CLINIC | Age: 48
End: 2023-05-10
Payer: COMMERCIAL

## 2023-10-26 ENCOUNTER — TRANSFERRED RECORDS (OUTPATIENT)
Dept: HEALTH INFORMATION MANAGEMENT | Facility: CLINIC | Age: 48
End: 2023-10-26
Payer: COMMERCIAL

## 2023-12-11 ENCOUNTER — TRANSFERRED RECORDS (OUTPATIENT)
Dept: HEALTH INFORMATION MANAGEMENT | Facility: CLINIC | Age: 48
End: 2023-12-11
Payer: COMMERCIAL

## 2023-12-13 ENCOUNTER — TRANSFERRED RECORDS (OUTPATIENT)
Dept: HEALTH INFORMATION MANAGEMENT | Facility: CLINIC | Age: 48
End: 2023-12-13
Payer: COMMERCIAL

## 2023-12-26 ENCOUNTER — PATIENT OUTREACH (OUTPATIENT)
Dept: CARE COORDINATION | Facility: CLINIC | Age: 48
End: 2023-12-26
Payer: COMMERCIAL

## 2024-01-09 ENCOUNTER — PATIENT OUTREACH (OUTPATIENT)
Dept: CARE COORDINATION | Facility: CLINIC | Age: 49
End: 2024-01-09
Payer: COMMERCIAL

## 2024-01-17 ENCOUNTER — TRANSFERRED RECORDS (OUTPATIENT)
Dept: HEALTH INFORMATION MANAGEMENT | Facility: CLINIC | Age: 49
End: 2024-01-17
Payer: COMMERCIAL

## 2024-01-23 ENCOUNTER — PATIENT OUTREACH (OUTPATIENT)
Dept: CARE COORDINATION | Facility: CLINIC | Age: 49
End: 2024-01-23
Payer: COMMERCIAL

## 2024-01-26 ENCOUNTER — TRANSFERRED RECORDS (OUTPATIENT)
Dept: HEALTH INFORMATION MANAGEMENT | Facility: CLINIC | Age: 49
End: 2024-01-26
Payer: COMMERCIAL

## 2024-03-06 ENCOUNTER — TRANSFERRED RECORDS (OUTPATIENT)
Dept: HEALTH INFORMATION MANAGEMENT | Facility: CLINIC | Age: 49
End: 2024-03-06
Payer: COMMERCIAL

## 2024-04-28 ENCOUNTER — HEALTH MAINTENANCE LETTER (OUTPATIENT)
Age: 49
End: 2024-04-28

## 2025-02-10 ENCOUNTER — TRANSFERRED RECORDS (OUTPATIENT)
Dept: MULTI SPECIALTY CLINIC | Facility: CLINIC | Age: 50
End: 2025-02-10

## 2025-04-05 ENCOUNTER — HEALTH MAINTENANCE LETTER (OUTPATIENT)
Age: 50
End: 2025-04-05

## 2025-04-24 ASSESSMENT — ASTHMA QUESTIONNAIRES
QUESTION_5 LAST FOUR WEEKS HOW WOULD YOU RATE YOUR ASTHMA CONTROL: COMPLETELY CONTROLLED
QUESTION_4 LAST FOUR WEEKS HOW OFTEN HAVE YOU USED YOUR RESCUE INHALER OR NEBULIZER MEDICATION (SUCH AS ALBUTEROL): NOT AT ALL
QUESTION_3 LAST FOUR WEEKS HOW OFTEN DID YOUR ASTHMA SYMPTOMS (WHEEZING, COUGHING, SHORTNESS OF BREATH, CHEST TIGHTNESS OR PAIN) WAKE YOU UP AT NIGHT OR EARLIER THAN USUAL IN THE MORNING: NOT AT ALL
QUESTION_2 LAST FOUR WEEKS HOW OFTEN HAVE YOU HAD SHORTNESS OF BREATH: ONCE OR TWICE A WEEK
QUESTION_1 LAST FOUR WEEKS HOW MUCH OF THE TIME DID YOUR ASTHMA KEEP YOU FROM GETTING AS MUCH DONE AT WORK, SCHOOL OR AT HOME: NONE OF THE TIME

## 2025-04-28 ASSESSMENT — ASTHMA QUESTIONNAIRES: ACT_TOTALSCORE: 24

## 2025-04-29 SDOH — HEALTH STABILITY: PHYSICAL HEALTH: ON AVERAGE, HOW MANY MINUTES DO YOU ENGAGE IN EXERCISE AT THIS LEVEL?: 30 MIN

## 2025-04-29 SDOH — HEALTH STABILITY: PHYSICAL HEALTH: ON AVERAGE, HOW MANY DAYS PER WEEK DO YOU ENGAGE IN MODERATE TO STRENUOUS EXERCISE (LIKE A BRISK WALK)?: 2 DAYS

## 2025-04-29 ASSESSMENT — SOCIAL DETERMINANTS OF HEALTH (SDOH): HOW OFTEN DO YOU GET TOGETHER WITH FRIENDS OR RELATIVES?: ONCE A WEEK

## 2025-04-30 ENCOUNTER — OFFICE VISIT (OUTPATIENT)
Dept: FAMILY MEDICINE | Facility: CLINIC | Age: 50
End: 2025-04-30
Payer: COMMERCIAL

## 2025-04-30 VITALS
DIASTOLIC BLOOD PRESSURE: 87 MMHG | HEART RATE: 115 BPM | TEMPERATURE: 99.3 F | BODY MASS INDEX: 35.59 KG/M2 | OXYGEN SATURATION: 96 % | HEIGHT: 65 IN | WEIGHT: 213.6 LBS | SYSTOLIC BLOOD PRESSURE: 136 MMHG | RESPIRATION RATE: 16 BRPM

## 2025-04-30 DIAGNOSIS — F32.81 PMDD (PREMENSTRUAL DYSPHORIC DISORDER): ICD-10-CM

## 2025-04-30 DIAGNOSIS — E66.812 CLASS 2 SEVERE OBESITY WITH SERIOUS COMORBIDITY AND BODY MASS INDEX (BMI) OF 36.0 TO 36.9 IN ADULT, UNSPECIFIED OBESITY TYPE (H): ICD-10-CM

## 2025-04-30 DIAGNOSIS — F90.9 ATTENTION DEFICIT HYPERACTIVITY DISORDER (ADHD), UNSPECIFIED ADHD TYPE: ICD-10-CM

## 2025-04-30 DIAGNOSIS — E66.01 CLASS 2 SEVERE OBESITY WITH SERIOUS COMORBIDITY AND BODY MASS INDEX (BMI) OF 36.0 TO 36.9 IN ADULT, UNSPECIFIED OBESITY TYPE (H): ICD-10-CM

## 2025-04-30 DIAGNOSIS — N95.1 MENOPAUSAL SYNDROME (HOT FLASHES): ICD-10-CM

## 2025-04-30 DIAGNOSIS — E06.3 HASHIMOTO'S THYROIDITIS: ICD-10-CM

## 2025-04-30 DIAGNOSIS — Z13.220 LIPID SCREENING: ICD-10-CM

## 2025-04-30 DIAGNOSIS — Z00.00 ROUTINE GENERAL MEDICAL EXAMINATION AT A HEALTH CARE FACILITY: Primary | ICD-10-CM

## 2025-04-30 DIAGNOSIS — J45.20 MILD INTERMITTENT ASTHMA WITHOUT COMPLICATION: ICD-10-CM

## 2025-04-30 LAB
EST. AVERAGE GLUCOSE BLD GHB EST-MCNC: 117 MG/DL
HBA1C MFR BLD: 5.7 % (ref 0–5.6)

## 2025-04-30 PROCEDURE — 99396 PREV VISIT EST AGE 40-64: CPT | Performed by: FAMILY MEDICINE

## 2025-04-30 PROCEDURE — 83036 HEMOGLOBIN GLYCOSYLATED A1C: CPT | Performed by: FAMILY MEDICINE

## 2025-04-30 PROCEDURE — 80061 LIPID PANEL: CPT | Performed by: FAMILY MEDICINE

## 2025-04-30 PROCEDURE — 36415 COLL VENOUS BLD VENIPUNCTURE: CPT | Performed by: FAMILY MEDICINE

## 2025-04-30 PROCEDURE — G2211 COMPLEX E/M VISIT ADD ON: HCPCS | Performed by: FAMILY MEDICINE

## 2025-04-30 PROCEDURE — 84439 ASSAY OF FREE THYROXINE: CPT | Performed by: FAMILY MEDICINE

## 2025-04-30 PROCEDURE — 84443 ASSAY THYROID STIM HORMONE: CPT | Performed by: FAMILY MEDICINE

## 2025-04-30 PROCEDURE — 99214 OFFICE O/P EST MOD 30 MIN: CPT | Mod: 25 | Performed by: FAMILY MEDICINE

## 2025-04-30 RX ORDER — LISDEXAMFETAMINE DIMESYLATE 70 MG/1
70 CAPSULE ORAL EVERY MORNING
COMMUNITY
Start: 2025-03-26

## 2025-04-30 RX ORDER — PROGESTERONE 100 MG/1
100 CAPSULE ORAL DAILY
Qty: 90 CAPSULE | Refills: 1 | Status: SHIPPED | OUTPATIENT
Start: 2025-04-30

## 2025-04-30 RX ORDER — ESTRADIOL 0.03 MG/D
1 FILM, EXTENDED RELEASE TRANSDERMAL
Qty: 8 PATCH | Refills: 1 | Status: SHIPPED | OUTPATIENT
Start: 2025-05-01

## 2025-04-30 RX ORDER — FLUOXETINE 10 MG/1
10 CAPSULE ORAL DAILY
Qty: 30 CAPSULE | Refills: 0 | Status: SHIPPED | OUTPATIENT
Start: 2025-04-30

## 2025-04-30 RX ORDER — ALBUTEROL SULFATE AND BUDESONIDE 90; 80 UG/1; UG/1
AEROSOL, METERED RESPIRATORY (INHALATION) PRN
COMMUNITY
Start: 2025-01-13

## 2025-04-30 RX ORDER — DIMENHYDRINATE 50 MG
1 TABLET ORAL DAILY
COMMUNITY

## 2025-04-30 RX ORDER — BUDESONIDE AND FORMOTEROL FUMARATE DIHYDRATE 80; 4.5 UG/1; UG/1
2 AEROSOL RESPIRATORY (INHALATION) DAILY
COMMUNITY
Start: 2024-12-03

## 2025-04-30 RX ORDER — INHALER, ASSIST DEVICES
SPACER (EA) MISCELLANEOUS
COMMUNITY
Start: 2025-01-13

## 2025-04-30 RX ORDER — LEVOTHYROXINE SODIUM 137 UG/1
137 TABLET ORAL DAILY
Qty: 90 TABLET | Refills: 0 | Status: SHIPPED | OUTPATIENT
Start: 2025-04-30

## 2025-04-30 NOTE — PROGRESS NOTES
Preventive Care Visit  [facility]  Yovana Aleman MD, Family Medicine  Apr 30, 2025      1. Routine general medical examination at a health care facility (Primary)  Katie comes in today for annual exam.  It has been a couple of years since I have seen her.  [facility]  She did have a Pap smear in 2024 which was normal so would recommend repeat in 2027 as she had previously had ASCUS with negative HPV.  She is up-to-date on mammogram.  Her next colonoscopy is due in 2028.  She is up-to-date on immunizations.    2. Lipid screening    - Lipid panel reflex to direct LDL Non-fasting; Future    3. Menopausal syndrome (hot flashes)  She did have some blood work done at [place] [facility] which confirmed an elevated FSH.  She is having symptoms severe enough that she would like to try treatment.  I did review potential adverse side effects and risk factors including DVT and slightly increased risk of breast cancer.  Will start with the lowest amount of estrogen first and she will let me know after a month if she still having a lot of hot flashes we can bump up the patch.  She is a little concerned about the adhesive so she will let me know if she reacts and we can switch it over to oral estrogen.  - estradiol (VIVELLE-DOT) 0.025 MG/24HR bi-weekly patch; Place 1 patch over 96 hours onto the skin twice a week.  Dispense: 8 patch; Refill: 1  - progesterone (PROMETRIUM) 100 MG capsule; Take 1 capsule (100 mg) by mouth daily.  Dispense: 90 capsule; Refill: 1    4. Hashimoto's thyroiditis  I did send refills.  - TSH WITH FREE T4 REFLEX; Future  - levothyroxine (SYNTHROID/LEVOTHROID) 137 MCG tablet; Take 1 tablet (137 mcg) by mouth daily.  Dispense: 90 tablet; Refill: 0    5. Class 2 severe obesity with serious comorbidity and body mass index (BMI) of 36.0 to 36.9 in adult, unspecified obesity type (H)  She has struggled with her weight and she will come  back and see me for comprehensive weight management.  She was getting semaglutide through a spot but it was too expensive but had a good response.  I discussed other options for semaglutide with her but they are cost prohibitive.  Will see her back and discuss other options.  - Hemoglobin A1c; Future    6. Attention deficit hyperactivity disorder (ADHD), unspecified ADHD type  She was diagnosed with ADHD since I last saw her.  She sees a psychiatrist and is on 70 mg of Vyvanse.    7. PMDD (premenstrual dysphoric disorder)  She had been on fluoxetine for mostly PMDD.  Now that she is postmenopausal she is wondering if she still needs it.  Will have her decrease to 10 mg for a month and see how she feels.  If things are feeling well, she can go off of it.  She will wait till she starts her hormones.  - FLUoxetine (PROZAC) 10 MG capsule; Take 1 capsule (10 mg) by mouth daily.  Dispense: 30 capsule; Refill: 0    8. Mild intermittent asthma without complication  Stable with current inhalers.  ACT equals 24.      Alma Delia Wilson is a 49 year old, presenting for the following:  Physical (With pap. )        4/30/2025    11:38 AM   Additional Questions   Roomed by         Via the Health Maintenance questionnaire, the patient has reported the following services have been completed -Mammogram: [facility] [facility] on 96, wbl 2025-02-10, this information has been sent to the abstraction team.    Healthy Habits:     Taking medications regularly:  1    Barriers to taking medications:  Remembering to take  History of Present Illness       Reason for visit:  Weight mgmt She is missing 1 dose(s) of medications per week.  She is not taking prescribed medications regularly due to remembering to take.    She comes in today for annual exam.  It has been about 2 years since I have seen her.  I did review everything from [facility] and it looks like she is up-to-date on preventative cares.  She did want to talk about menopause.  She  feels like she has had hot flashes and decreased libido.  It is bad enough that she would want to treat it.  She did get some blood work done at [place] [facility] that she was able to show me.  She is been about a year since she had her last period.  She has no history of breast cancer in her family.    The longitudinal plan of care for the diagnosis(es)/condition(s) as documented were addressed during this visit. Due to the added complexity in care, I will continue to support Katie in the subsequent management and with ongoing continuity of care.         Advance Care Planning    Discussed advance care planning with patient; informed AVS has link to [facility].        4/29/2025   General Health   How would you rate your overall physical health? (!) FAIR   Feel stress (tense, anxious, or unable to sleep) Only a little   (!) STRESS CONCERN      4/29/2025   Nutrition   Three or more servings of calcium each day? Yes   Diet: I don't know   How many servings of fruit and vegetables per day? (!) 2-3   How many sweetened beverages each day? 0-1         4/29/2025   Exercise   Days per week of moderate/strenous exercise 2 days   Average minutes spent exercising at this level 30 min   (!) EXERCISE CONCERN      4/29/2025   Social Factors   Frequency of gathering with friends or relatives Once a week   Worry food won't last until get money to buy more No   Food not last or not have enough money for food? No   Do you have housing? (Housing is defined as stable permanent housing and does not include staying outside in a car, in a tent, in an abandoned building, in an overnight shelter, or couch-surfing.) Yes   Are you worried about losing your housing? No   Lack of transportation? No   Unable to get utilities (heat,electricity)? No         4/29/2025   Fall Risk   Fallen 2 or more times in the past year? No   Trouble with walking or balance? No          4/29/2025   Dental   Dentist two times every year? Yes          Today's PHQ-2 Score:       4/29/2025     8:44 AM   PHQ-2 ( 1999 Pfizer)   Q1: Little interest or pleasure in doing things 0   Q2: Feeling down, depressed or hopeless 0   PHQ-2 Score 0    Q1: Little interest or pleasure in doing things Not at all   Q2: Feeling down, depressed or hopeless Not at all   PHQ-2 Score 0       Patient-reported           4/29/2025   Substance Use   Alcohol more than 3/day or more than 7/wk Not Applicable   Do you use any other substances recreationally? No     Social History     Tobacco Use    Smoking status: Never    Smokeless tobacco: Never   Vaping Use    Vaping status: Never Used   Substance Use Topics    Alcohol use: No    Drug use: No          Mammogram Screening - Mammogram every 1-2 years updated in Health Maintenance based on mutual decision making          4/29/2025   One time HIV Screening   Previous HIV test? Yes         4/29/2025   STI Screening   New sexual partner(s) since last STI/HIV test? No     History of abnormal Pap smear: YES - reflected in Problem List and Health Maintenance accordingly        Latest Ref Rng & Units 1/19/2021     9:51 AM 1/12/2017     7:52 AM   PAP / HPV   PAP Negative for squamous intraepithelial lesion or malignancy. Atypical squamous cells of undetermined significance  Electronically signed by Castro Chadwick MD on 1/29/2021 at  4:06 PM    Negative for squamous intraepithelial lesion or malignancy  Electronically signed by Yessica Cotton CT (ASCP) on 1/19/2017 at  2:08 PM      HPV 16 DNA NEG Negative     HPV 18 DNA NEG Negative     Other HR HPV NEG Negative       ASCVD Risk   The 10-year ASCVD risk score (Barbi MARSHALL, et al., 2019) is: 1.9%    Values used to calculate the score:      Age: 49 years      Sex: Female      Is Non- : No      Diabetic: No      Tobacco smoker: No      Systolic Blood Pressure: 136 mmHg      Is BP treated: No      HDL Cholesterol: 41 mg/dL      Total Cholesterol: 184  "mg/dL           Reviewed and updated as needed this visit by Provider                    Lab work is in process  Current Outpatient Medications   Medication Sig Dispense Refill    AIRSUPRA 90-80 MCG/ACT AERO as needed.      albuterol (PROAIR HFA/PROVENTIL HFA/VENTOLIN HFA) 108 (90 Base) MCG/ACT inhaler Inhale 2 puffs into the lungs as needed for shortness of breath / dyspnea or wheezing 18 g 0    budesonide-formoterol (SYMBICORT/BREYNA) 80-4.5 MCG/ACT Inhaler Inhale 2 puffs into the lungs daily.      EPINEPHrine (ANY BX GENERIC EQUIV) 0.3 MG/0.3ML injection 2-pack 0.3 mg      [START ON 5/1/2025] estradiol (VIVELLE-DOT) 0.025 MG/24HR bi-weekly patch Place 1 patch over 96 hours onto the skin twice a week. 8 patch 1    Flaxseed, Linseed, (FLAX SEED OIL) 1000 MG capsule Take 1 capsule by mouth daily.      FLUoxetine (PROZAC) 10 MG capsule Take 1 capsule (10 mg) by mouth daily. 30 capsule 0    FLUoxetine (PROZAC) 20 MG capsule Take 20 mg by mouth daily      levothyroxine (SYNTHROID/LEVOTHROID) 137 MCG tablet Take 1 tablet (137 mcg) by mouth daily. 90 tablet 0    lisdexamfetamine (VYVANSE) 70 MG capsule Take 70 mg by mouth every morning.      magnesium 100 MG TABS Take 300 mg by mouth At Bedtime      progesterone (PROMETRIUM) 100 MG capsule Take 1 capsule (100 mg) by mouth daily. 90 capsule 1    spacer (OPTICHAMBER ANTON) holding chamber ATTACH AND USE WITH ASTHMA INHALER      vitamin D3 (CHOLECALCIFEROL) 250 mcg (01111 units) capsule            Review of Systems  Constitutional, HEENT, cardiovascular, pulmonary, gi and gu systems are negative, except as otherwise noted.     Objective    Exam  /87   Pulse 115   Temp 99.3  F (37.4  C)   Resp 16   Ht 1.638 m (5' 4.5\")   Wt 96.9 kg (213 lb 9.6 oz)   LMP  (LMP Unknown)   SpO2 96%   BMI 36.10 kg/m     Estimated body mass index is 36.1 kg/m  as calculated from the following:    Height as of this encounter: 1.638 m (5' 4.5\").    Weight as of this encounter: 96.9 " kg (213 lb 9.6 oz).    Physical Exam  GENERAL: alert and no distress  RESP: lungs clear to auscultation - no rales, rhonchi or wheezes  CV: regular rate and rhythm, normal S1 S2, no S3 or S4, no murmur, click or rub, no peripheral edema  PSYCH: mentation appears normal, affect normal/bright        Signed Electronically by: Yovana Aleman MD

## 2025-04-30 NOTE — PATIENT INSTRUCTIONS
Patient Education   Preventive Care Advice   This is general advice given by our system to help you stay healthy. However, your care team may have specific advice just for you. Please talk to your care team about your preventive care needs.  Nutrition  Eat 5 or more servings of fruits and vegetables each day.  Try wheat bread, brown rice and whole grain pasta (instead of white bread, rice, and pasta).  Get enough calcium and vitamin D. Check the label on foods and aim for 100% of the RDA (recommended daily allowance).  Lifestyle  Exercise at least 150 minutes each week  (30 minutes a day, 5 days a week).  Do muscle strengthening activities 2 days a week. These help control your weight and prevent disease.  No smoking.  Wear sunscreen to prevent skin cancer.  Have a dental exam and cleaning every 6 months.  Yearly exams  See your health care team every year to talk about:  Any changes in your health.  Any medicines your care team has prescribed.  Preventive care, family planning, and ways to prevent chronic diseases.  Shots (vaccines)   HPV shots (up to age 26), if you've never had them before.  Hepatitis B shots (up to age 59), if you've never had them before.  COVID-19 shot: Get this shot when it's due.  Flu shot: Get a flu shot every year.  Tetanus shot: Get a tetanus shot every 10 years.  Pneumococcal, hepatitis A, and RSV shots: Ask your care team if you need these based on your risk.  Shingles shot (for age 50 and up)  General health tests  Diabetes screening:  Starting at age 35, Get screened for diabetes at least every 3 years.  If you are younger than age 35, ask your care team if you should be screened for diabetes.  Cholesterol test: At age 39, start having a cholesterol test every 5 years, or more often if advised.  Bone density scan (DEXA): At age 50, ask your care team if you should have this scan for osteoporosis (brittle bones).  Hepatitis C: Get tested at least once in your life.  STIs (sexually  transmitted infections)  Before age 24: Ask your care team if you should be screened for STIs.  After age 24: Get screened for STIs if you're at risk. You are at risk for STIs (including HIV) if:  You are sexually active with more than one person.  You don't use condoms every time.  You or a partner was diagnosed with a sexually transmitted infection.  If you are at risk for HIV, ask about PrEP medicine to prevent HIV.  Get tested for HIV at least once in your life, whether you are at risk for HIV or not.  Cancer screening tests  Cervical cancer screening: If you have a cervix, begin getting regular cervical cancer screening tests starting at age 21.  Breast cancer scan (mammogram): If you've ever had breasts, begin having regular mammograms starting at age 40. This is a scan to check for breast cancer.  Colon cancer screening: It is important to start screening for colon cancer at age 45.  Have a colonoscopy test every 10 years (or more often if you're at risk) Or, ask your provider about stool tests like a FIT test every year or Cologuard test every 3 years.  To learn more about your testing options, visit:   .  For help making a decision, visit:   https://bit.ly/wb83078.  Prostate cancer screening test: If you have a prostate, ask your care team if a prostate cancer screening test (PSA) at age 55 is right for you.  Lung cancer screening: If you are a current or former smoker ages 50 to 80, ask your care team if ongoing lung cancer screenings are right for you.  For informational purposes only. Not to replace the advice of your health care provider. Copyright   2023 [employer]. All rights reserved. Clinically reviewed by the [facility] Transitions Program. [employer] [id number] - REV 01/24.

## 2025-05-01 LAB
CHOLEST SERPL-MCNC: 187 MG/DL
FASTING STATUS PATIENT QL REPORTED: YES
HDLC SERPL-MCNC: 47 MG/DL
LDLC SERPL CALC-MCNC: 106 MG/DL
NONHDLC SERPL-MCNC: 140 MG/DL
T4 FREE SERPL-MCNC: 1.16 NG/DL (ref 0.9–1.7)
TRIGL SERPL-MCNC: 169 MG/DL
TSH SERPL DL<=0.005 MIU/L-ACNC: 4.54 UIU/ML (ref 0.3–4.2)

## 2025-05-19 ENCOUNTER — OFFICE VISIT (OUTPATIENT)
Dept: FAMILY MEDICINE | Facility: CLINIC | Age: 50
End: 2025-05-19
Payer: COMMERCIAL

## 2025-05-19 VITALS
BODY MASS INDEX: 35.85 KG/M2 | OXYGEN SATURATION: 99 % | RESPIRATION RATE: 16 BRPM | HEIGHT: 65 IN | HEART RATE: 91 BPM | DIASTOLIC BLOOD PRESSURE: 77 MMHG | SYSTOLIC BLOOD PRESSURE: 138 MMHG | WEIGHT: 215.2 LBS

## 2025-05-19 DIAGNOSIS — E66.01 CLASS 2 SEVERE OBESITY WITH SERIOUS COMORBIDITY AND BODY MASS INDEX (BMI) OF 36.0 TO 36.9 IN ADULT, UNSPECIFIED OBESITY TYPE (H): Primary | ICD-10-CM

## 2025-05-19 DIAGNOSIS — F32.81 PMDD (PREMENSTRUAL DYSPHORIC DISORDER): ICD-10-CM

## 2025-05-19 DIAGNOSIS — R73.03 PREDIABETES: ICD-10-CM

## 2025-05-19 DIAGNOSIS — E66.812 CLASS 2 SEVERE OBESITY WITH SERIOUS COMORBIDITY AND BODY MASS INDEX (BMI) OF 36.0 TO 36.9 IN ADULT, UNSPECIFIED OBESITY TYPE (H): Primary | ICD-10-CM

## 2025-05-19 DIAGNOSIS — E78.00 HYPERCHOLESTEREMIA: ICD-10-CM

## 2025-05-19 DIAGNOSIS — R03.0 ELEVATED BLOOD PRESSURE READING WITHOUT DIAGNOSIS OF HYPERTENSION: ICD-10-CM

## 2025-05-19 PROCEDURE — G2211 COMPLEX E/M VISIT ADD ON: HCPCS | Performed by: FAMILY MEDICINE

## 2025-05-19 PROCEDURE — 99215 OFFICE O/P EST HI 40 MIN: CPT | Performed by: FAMILY MEDICINE
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
## 2025-05-23 DIAGNOSIS — N95.1 MENOPAUSAL SYNDROME (HOT FLASHES): ICD-10-CM

## 2025-05-26 RX ORDER — ESTRADIOL 0.03 MG/D
1 FILM, EXTENDED RELEASE TRANSDERMAL
Qty: 24 PATCH | Refills: 1 | Status: SHIPPED | OUTPATIENT
Start: 2025-05-26

## 2025-06-01 ENCOUNTER — MYC MEDICAL ADVICE (OUTPATIENT)
Dept: FAMILY MEDICINE | Facility: CLINIC | Age: 50
End: 2025-06-01
Payer: COMMERCIAL

## 2025-06-01 DIAGNOSIS — E66.812 CLASS 2 SEVERE OBESITY WITH SERIOUS COMORBIDITY AND BODY MASS INDEX (BMI) OF 36.0 TO 36.9 IN ADULT, UNSPECIFIED OBESITY TYPE (H): Primary | ICD-10-CM

## 2025-06-01 DIAGNOSIS — E66.01 CLASS 2 SEVERE OBESITY WITH SERIOUS COMORBIDITY AND BODY MASS INDEX (BMI) OF 36.0 TO 36.9 IN ADULT, UNSPECIFIED OBESITY TYPE (H): Primary | ICD-10-CM

## 2025-06-24 DIAGNOSIS — E66.01 CLASS 2 SEVERE OBESITY WITH SERIOUS COMORBIDITY AND BODY MASS INDEX (BMI) OF 36.0 TO 36.9 IN ADULT, UNSPECIFIED OBESITY TYPE (H): ICD-10-CM

## 2025-06-24 DIAGNOSIS — E66.812 CLASS 2 SEVERE OBESITY WITH SERIOUS COMORBIDITY AND BODY MASS INDEX (BMI) OF 36.0 TO 36.9 IN ADULT, UNSPECIFIED OBESITY TYPE (H): ICD-10-CM

## 2025-07-04 ENCOUNTER — MYC MEDICAL ADVICE (OUTPATIENT)
Dept: FAMILY MEDICINE | Facility: CLINIC | Age: 50
End: 2025-07-04
Payer: COMMERCIAL

## 2025-07-04 DIAGNOSIS — E66.01 CLASS 2 SEVERE OBESITY WITH SERIOUS COMORBIDITY AND BODY MASS INDEX (BMI) OF 36.0 TO 36.9 IN ADULT, UNSPECIFIED OBESITY TYPE (H): Primary | ICD-10-CM

## 2025-07-04 DIAGNOSIS — E66.812 CLASS 2 SEVERE OBESITY WITH SERIOUS COMORBIDITY AND BODY MASS INDEX (BMI) OF 36.0 TO 36.9 IN ADULT, UNSPECIFIED OBESITY TYPE (H): Primary | ICD-10-CM

## 2025-07-11 ENCOUNTER — DOCUMENTATION ONLY (OUTPATIENT)
Dept: LAB | Facility: CLINIC | Age: 50
End: 2025-07-11
Payer: COMMERCIAL

## 2025-07-11 DIAGNOSIS — E07.9 DISEASE OF THYROID GLAND: Primary | ICD-10-CM

## 2025-07-11 NOTE — PROGRESS NOTES
Katie Santoro has an upcoming lab appointment:    Future Appointments   Date Time Provider Department Center   7/15/2025 10:00 AM [facility]   7/30/2025  7:30 AM [facility]   8/20/2025 10:30 AM Yovana Aleman MD [facility]     Patient is scheduled for the following lab(s): full thyroid recheck, there is no order available. Please review and place either future orders or HMPO (Review of Health Maintenance Protocol Orders), as appropriate.    Health Maintenance Due   Topic    HIV SCREENING     HEPATITIS C SCREENING     ANNUAL REVIEW OF HM ORDERS      Monique Gunderson

## 2025-07-15 ENCOUNTER — ANCILLARY PROCEDURE (OUTPATIENT)
Dept: MAMMOGRAPHY | Facility: CLINIC | Age: 50
End: 2025-07-15
Attending: FAMILY MEDICINE
Payer: COMMERCIAL

## 2025-07-15 DIAGNOSIS — Z12.31 VISIT FOR SCREENING MAMMOGRAM: ICD-10-CM

## 2025-07-15 PROCEDURE — 77067 SCR MAMMO BI INCL CAD: CPT | Mod: TC | Performed by: STUDENT IN AN ORGANIZED HEALTH CARE EDUCATION/TRAINING PROGRAM

## 2025-07-15 PROCEDURE — 77063 BREAST TOMOSYNTHESIS BI: CPT | Mod: TC | Performed by: STUDENT IN AN ORGANIZED HEALTH CARE EDUCATION/TRAINING PROGRAM

## 2025-07-24 DIAGNOSIS — E06.3 HASHIMOTO'S THYROIDITIS: ICD-10-CM

## 2025-07-24 RX ORDER — LEVOTHYROXINE SODIUM 137 UG/1
137 TABLET ORAL DAILY
Qty: 90 TABLET | Refills: 0 | Status: SHIPPED | OUTPATIENT
Start: 2025-07-24

## 2025-07-25 DIAGNOSIS — E66.812 CLASS 2 SEVERE OBESITY WITH SERIOUS COMORBIDITY AND BODY MASS INDEX (BMI) OF 36.0 TO 36.9 IN ADULT, UNSPECIFIED OBESITY TYPE (H): ICD-10-CM

## 2025-07-25 DIAGNOSIS — E66.01 CLASS 2 SEVERE OBESITY WITH SERIOUS COMORBIDITY AND BODY MASS INDEX (BMI) OF 36.0 TO 36.9 IN ADULT, UNSPECIFIED OBESITY TYPE (H): ICD-10-CM

## 2025-07-28 RX ORDER — TIRZEPATIDE 7.5 MG/.5ML
INJECTION, SOLUTION SUBCUTANEOUS
Qty: 2 ML | Refills: 0 | Status: SHIPPED | OUTPATIENT
Start: 2025-07-28

## 2025-07-29 ENCOUNTER — MYC MEDICAL ADVICE (OUTPATIENT)
Dept: FAMILY MEDICINE | Facility: CLINIC | Age: 50
End: 2025-07-29
Payer: COMMERCIAL

## 2025-07-29 DIAGNOSIS — E66.01 CLASS 2 SEVERE OBESITY WITH SERIOUS COMORBIDITY AND BODY MASS INDEX (BMI) OF 36.0 TO 36.9 IN ADULT, UNSPECIFIED OBESITY TYPE (H): Primary | ICD-10-CM

## 2025-07-29 DIAGNOSIS — E66.812 CLASS 2 SEVERE OBESITY WITH SERIOUS COMORBIDITY AND BODY MASS INDEX (BMI) OF 36.0 TO 36.9 IN ADULT, UNSPECIFIED OBESITY TYPE (H): Primary | ICD-10-CM

## 2025-07-30 ENCOUNTER — LAB (OUTPATIENT)
Dept: LAB | Facility: CLINIC | Age: 50
End: 2025-07-30
Payer: COMMERCIAL

## 2025-07-30 DIAGNOSIS — E07.9 DISEASE OF THYROID GLAND: ICD-10-CM

## 2025-07-30 LAB — TSH SERPL DL<=0.005 MIU/L-ACNC: 0.34 UIU/ML (ref 0.3–4.2)

## 2025-07-30 PROCEDURE — 84443 ASSAY THYROID STIM HORMONE: CPT

## 2025-07-30 PROCEDURE — 36415 COLL VENOUS BLD VENIPUNCTURE: CPT

## 2025-08-20 ENCOUNTER — OFFICE VISIT (OUTPATIENT)
Dept: FAMILY MEDICINE | Facility: CLINIC | Age: 50
End: 2025-08-20
Payer: COMMERCIAL

## 2025-08-20 VITALS
OXYGEN SATURATION: 100 % | BODY MASS INDEX: 31.65 KG/M2 | SYSTOLIC BLOOD PRESSURE: 122 MMHG | HEART RATE: 75 BPM | DIASTOLIC BLOOD PRESSURE: 79 MMHG | HEIGHT: 65 IN | WEIGHT: 190 LBS | RESPIRATION RATE: 16 BRPM

## 2025-08-20 DIAGNOSIS — E07.9 DISEASE OF THYROID GLAND: ICD-10-CM

## 2025-08-20 DIAGNOSIS — E66.811 CLASS 1 OBESITY WITH SERIOUS COMORBIDITY AND BODY MASS INDEX (BMI) OF 32.0 TO 32.9 IN ADULT, UNSPECIFIED OBESITY TYPE: Primary | ICD-10-CM

## 2025-08-20 DIAGNOSIS — R73.03 PREDIABETES: ICD-10-CM

## 2025-08-20 PROCEDURE — 99215 OFFICE O/P EST HI 40 MIN: CPT | Performed by: FAMILY MEDICINE

## 2025-08-20 PROCEDURE — G2211 COMPLEX E/M VISIT ADD ON: HCPCS | Performed by: FAMILY MEDICINE
